# Patient Record
Sex: FEMALE | Race: BLACK OR AFRICAN AMERICAN | Employment: OTHER | URBAN - METROPOLITAN AREA
[De-identification: names, ages, dates, MRNs, and addresses within clinical notes are randomized per-mention and may not be internally consistent; named-entity substitution may affect disease eponyms.]

---

## 2022-03-18 PROBLEM — I44.2 CHB (COMPLETE HEART BLOCK) (HCC): Status: ACTIVE | Noted: 2018-06-15

## 2022-03-18 PROBLEM — Z79.1 LONG TERM (CURRENT) USE OF NON-STEROIDAL ANTI-INFLAMMATORIES (NSAID): Status: ACTIVE | Noted: 2021-08-12

## 2022-03-18 PROBLEM — Z79.891 LONG TERM (CURRENT) USE OF OPIATE ANALGESIC: Status: ACTIVE | Noted: 2021-08-12

## 2022-03-19 PROBLEM — M79.2 PERIPHERAL NEUROPATHIC PAIN: Status: ACTIVE | Noted: 2021-08-12

## 2022-03-19 PROBLEM — M54.50 LOW BACK PAIN: Status: ACTIVE | Noted: 2021-08-12

## 2022-03-19 PROBLEM — M1A.00X0 CHRONIC GOUTY ARTHRITIS: Status: ACTIVE | Noted: 2021-08-12

## 2022-03-19 PROBLEM — E66.811 CLASS 1 OBESITY: Status: ACTIVE | Noted: 2021-08-12

## 2022-03-19 PROBLEM — R06.09 EXERTIONAL DYSPNEA: Status: ACTIVE | Noted: 2020-01-02

## 2022-03-19 PROBLEM — M1A.9XX0 CHRONIC GOUTY ARTHRITIS: Status: ACTIVE | Noted: 2021-08-12

## 2022-03-19 PROBLEM — I34.0 MODERATE MITRAL REGURGITATION: Status: ACTIVE | Noted: 2021-01-26

## 2022-03-19 PROBLEM — R01.1 MURMUR: Status: ACTIVE | Noted: 2020-01-02

## 2022-03-19 PROBLEM — I35.0 MODERATE AORTIC STENOSIS: Status: ACTIVE | Noted: 2021-01-26

## 2022-03-19 PROBLEM — M54.2 NECK PAIN: Status: ACTIVE | Noted: 2021-08-12

## 2022-03-19 PROBLEM — E66.9 CLASS 1 OBESITY: Status: ACTIVE | Noted: 2021-08-12

## 2022-03-20 PROBLEM — I27.20 PULMONARY HYPERTENSION (HCC): Status: ACTIVE | Noted: 2022-02-09

## 2022-03-20 PROBLEM — I35.1 AORTIC VALVE REGURGITATION: Status: ACTIVE | Noted: 2021-01-26

## 2022-06-13 ENCOUNTER — TELEPHONE (OUTPATIENT)
Dept: PULMONOLOGY | Age: 87
End: 2022-06-13

## 2022-06-13 NOTE — TELEPHONE ENCOUNTER
Patient's nebulizer is running hot. Took it to [facility] and they can not do anything.   Asking for another nebulizer

## 2022-06-15 DIAGNOSIS — J44.9 OBSTRUCTIVE CHRONIC BRONCHITIS WITHOUT EXACERBATION (HCC): Primary | ICD-10-CM

## 2022-06-27 ENCOUNTER — TELEPHONE (OUTPATIENT)
Dept: PULMONOLOGY | Age: 87
End: 2022-06-27

## 2022-06-27 ENCOUNTER — OFFICE VISIT (OUTPATIENT)
Dept: PULMONOLOGY | Age: 87
End: 2022-06-27
Payer: MEDICARE

## 2022-06-27 ENCOUNTER — CLINICAL DOCUMENTATION (OUTPATIENT)
Dept: PULMONOLOGY | Age: 87
End: 2022-06-27

## 2022-06-27 VITALS
SYSTOLIC BLOOD PRESSURE: 126 MMHG | OXYGEN SATURATION: 97 % | BODY MASS INDEX: 31.61 KG/M2 | HEIGHT: 60 IN | TEMPERATURE: 97.5 F | DIASTOLIC BLOOD PRESSURE: 84 MMHG | WEIGHT: 161 LBS | HEART RATE: 88 BPM

## 2022-06-27 DIAGNOSIS — Z95.2 S/P TAVR (TRANSCATHETER AORTIC VALVE REPLACEMENT): ICD-10-CM

## 2022-06-27 DIAGNOSIS — I42.0 DILATED CARDIOMYOPATHY (HCC): ICD-10-CM

## 2022-06-27 DIAGNOSIS — I34.0 MODERATE MITRAL REGURGITATION: ICD-10-CM

## 2022-06-27 DIAGNOSIS — J44.9 OBSTRUCTIVE CHRONIC BRONCHITIS WITHOUT EXACERBATION (HCC): ICD-10-CM

## 2022-06-27 DIAGNOSIS — R09.02 HYPOXIA: ICD-10-CM

## 2022-06-27 DIAGNOSIS — R06.09 EXERTIONAL DYSPNEA: Primary | ICD-10-CM

## 2022-06-27 DIAGNOSIS — I27.20 PULMONARY HYPERTENSION (HCC): ICD-10-CM

## 2022-06-27 PROCEDURE — 99214 OFFICE O/P EST MOD 30 MIN: CPT | Performed by: NURSE PRACTITIONER

## 2022-06-27 PROCEDURE — 1090F PRES/ABSN URINE INCON ASSESS: CPT | Performed by: NURSE PRACTITIONER

## 2022-06-27 PROCEDURE — 1123F ACP DISCUSS/DSCN MKR DOCD: CPT | Performed by: NURSE PRACTITIONER

## 2022-06-27 PROCEDURE — G8427 DOCREV CUR MEDS BY ELIG CLIN: HCPCS | Performed by: NURSE PRACTITIONER

## 2022-06-27 PROCEDURE — 3023F SPIROM DOC REV: CPT | Performed by: NURSE PRACTITIONER

## 2022-06-27 PROCEDURE — G8417 CALC BMI ABV UP PARAM F/U: HCPCS | Performed by: NURSE PRACTITIONER

## 2022-06-27 PROCEDURE — 1036F TOBACCO NON-USER: CPT | Performed by: NURSE PRACTITIONER

## 2022-06-27 RX ORDER — LOSARTAN POTASSIUM 50 MG/1
50 TABLET ORAL DAILY
COMMUNITY

## 2022-06-27 RX ORDER — ASPIRIN 81 MG/1
81 TABLET ORAL DAILY
COMMUNITY

## 2022-06-27 ASSESSMENT — ENCOUNTER SYMPTOMS
ABDOMINAL PAIN: 0
SHORTNESS OF BREATH: 1
CONSTIPATION: 0
CHEST TIGHTNESS: 0
VOMITING: 0
NAUSEA: 0
COUGH: 0
DIARRHEA: 0
WHEEZING: 0

## 2022-06-27 NOTE — PROGRESS NOTES
She has a very pleasant 70-year-old female, well-known to me, with history of COPD, hypoxemia, pulmonary hypertension who was seen as a work in secondary to worsening shortness of breath over the past 2 days. History is also notable for complete heart block, aortic stenosis/regurgitation, mitral regurgitation;  history of dilated cardiomyopathy which normalized on follow-up echo, followed  by Dr. Bere Thorne.  RVSP estimated at 48 mmHg. Review of visit 9/2021 with Dr. Bere Thorne indicates that aortic stenosis is borderline severe and nearing indication for AVR, possible TAVR. Patient is now status post TAVR 3/16/2022. Echo 4/12/2022 severely dilated left atrium. LVH, EF 81-06%, grade 1 diastolic dysfunction, bioprosthetic AV. Peak gradient 60 mmHg, mean gradient 31 mmHg. RVSP 28 mmHg. Patient's family reported to us today that she had cardiology appointment this morning. Review of record reveals that this was for pacemaker device check and notes indicate normal function. DIAGNOSTICS:         [facility] 2008. Spirometry 8/06 - moderate obstructive defect. Spirometry 2007, 2009, 2010. CXR 2006, 2007, 2009, 2010 - no acute process. CT of neck 3/10 - normal.   Bronchoscopy 3/10 - normal.   Spirometry 9/17/10 - normal but interval decline in FVC and FEV 1. Spirometry 9/11 -- mild airway obstruction, there is mild interval decline in FEV1. CXR 9/11 -- no acute abnormality. PA and lateral CXR 10/22/12 -- mild hyperinflation, no air space disease, stable. Spirometry 4/22/13 -- mild airway obstruction, interval decline. Spirometry 11/5/14 - mild obstruction, no significant change. CXR 11/5/14 - Stable cardiomegaly, mild hyperinflation. No significant interval change. Spirometry 4/2015 - normal.   CXR 4/8/2015 - no acute abnormality, stable mild cardiomegaly.    Spirometry 8/10/2015 - normal.   ECHO 6/5/2017 demonstrated moderate left atrial dilatation, mild LVH, EF 5055%, mild MR, moderate pulmonary hypertension with RVSP estimated at 49 mmHg. Ambulatory oximetry 9/27/2017baseline saturation room air at rest 92%, saturation 88% on room air with ambulation, saturation 96% on 2 L/min with ambulation. Overnight oximetry 10/2017adequate saturation on 3 L/min. Spirometry 12/20/2017 - mild obstructive defect, interval improvement. Spirometry 12/31/2018 - mild obstructive defect, interval decline, technically limited study. Echo 1/7/2020 EF 85-73%, grade 1 diastolic dysfunction, severely dilated left atrium, mildmoderate AR, moderate MR and AS.  RVSP estimated at 45 mmHg. Spirometry 1/22/2020 mild obstructive defect, slight improvement in FVC and FEV1. Spirometry 1/26/2021mild obstructive defect, interval decrease in FVC and FEV1. Echo 2/10/2021EF 60-65%, grade II diastolic dysfunction, severe aortic stenosis, mild AR, mildmoderate MR, mildmoderate TR, mild pulmonary hypertension with  estimated RVSP of 48 mmHg. Echo 4/12/2022 severely dilated left atrium. LVH, EF 50-84%, grade 1 diastolic dysfunction, bioprosthetic AV. Peak gradient 60 mmHg, mean gradient 31 mmHg.   RVSP 28 mmHg.

## 2022-06-27 NOTE — TELEPHONE ENCOUNTER
TRIAGE CALL      Complaint: Increased SOB  Cough: no  Productive:  no  Bloody Sputum:  no  Increased SOB/Wheezing:  yes  Duration: 2 days  Fever/Chills: no  OTC Meds tried: none  Asking if she can be seen today

## 2022-06-27 NOTE — PROGRESS NOTES
[address] S[place] Trujillo. [address], [address] [facility]  [phone]    Patient Name:  Tracy Matthew    YOB: 1931    Office Visit 6/27/2022      CHIEF COMPLAINT:      Chief Complaint   Patient presents with    Shortness of Breath    Other     work-in         HISTORY OF PRESENT ILLNESS:     She has a very pleasant 70-year-old female, well-known to me, with history of COPD, hypoxemia, pulmonary hypertension who was seen as a work in secondary to worsening shortness of breath over the past 2 days.     To recap, history is also notable for complete heart block, aortic stenosis/regurgitation, mitral regurgitation;  history of dilated cardiomyopathy which normalized on follow-up echo, followed  by Dr. Abiel Jones.  RVSP estimated at 48 mmHg. Review of visit 9/2021 with Dr. Abiel Jones indicates that aortic stenosis is borderline severe and nearing indication for AVR, possible TAVR.        Patient is now status post TAVR 3/16/2022. Echo 4/12/2022- severely dilated left atrium. LVH, EF 94-08%, grade 1 diastolic dysfunction, bioprosthetic AV. Peak gradient 60 mmHg, mean gradient 31 mmHg. RVSP 28 mmHg. Patient's family reported to us today that she had cardiology appointment this morning. Review of record reveals that this was for pacemaker device check and notes indicate normal function. Today, she reports that dyspnea is improved compared to prior to TAVR but has increased somewhat over the past 3 weeks. Her family has reported increased over the past 2 days. She reports very minimal wheezing. She denies cough, chest pain, hemoptysis. She is using DuoNeb via nebulizer 3 times daily and budesonide twice daily. Using albuterol inhaler intermittently during the day for relief of shortness of breath. States that she has been using it on average of 3 times daily. She normally uses oxygen with exertion and sleep but does not have it with her today.   She reports compliance with Lasix, low-dose. She is remarkably active for 80year-old. She continues to drive, performs all of her household chores including washing windows and cleaning the blinds. States that she has a zero turn lawnmower and mows 3 acres after her children after her children get the mower started for her.     DIAGNOSTICS:        [facility] 2008.   Spirometry 8/06 - moderate obstructive defect.   Spirometry 2007, 2009, 2010.   CXR 2006, 2007, 2009, 2010 - no acute process.   CT of neck 3/10 - normal.   Bronchoscopy 3/10 - normal.   Spirometry 9/17/10 - normal but interval decline in FVC and FEV 1.    Spirometry 9/11 -- mild airway obstruction, there is mild interval decline in FEV1.    CXR 9/11 -- no acute abnormality.    PA and lateral CXR 10/22/12 -- mild hyperinflation, no air space disease, stable.    Spirometry 4/22/13 -- mild airway obstruction, interval decline.   Spirometry 11/5/14 - mild obstruction, no significant change.  CXR 11/5/14 - Stable cardiomegaly, mild hyperinflation.  No significant interval change.   Spirometry 4/2015 - normal.   CXR 4/8/2015 - no acute abnormality, stable mild cardiomegaly.   Spirometry 8/10/2015 - normal.   ECHO 6/5/2017 demonstrated moderate left atrial dilatation, mild LVH, EF 50-55%, mild MR, moderate pulmonary hypertension with RVSP estimated at 49 mmHg.   Ambulatory oximetry 9/27/2017-baseline saturation room air at rest 92%, saturation 88% on room air with ambulation, saturation 96% on 2 L/min with ambulation.   Overnight oximetry 10/2017-adequate saturation on 3 L/min.   Spirometry 12/20/2017 - mild obstructive defect, interval improvement.   Spirometry 12/31/2018 - mild obstructive defect, interval decline, technically limited study.   Echo 1/7/2020- EF 01-78%, grade 1 diastolic dysfunction, severely dilated left atrium, mild-moderate AR, moderate MR and AS.  RVSP estimated at 45 mmHg.   Spirometry 1/22/2020- mild obstructive defect, slight improvement in FVC and FEV1.   Spirometry 1/26/2021-mild obstructive defect, interval decrease in FVC and FEV1.   Echo 2/10/2021-EF 60-65%, grade II diastolic dysfunction, severe aortic stenosis, mild AR, mild-moderate MR, mild-moderate TR, mild pulmonary hypertension with  estimated RVSP of 48 mmHg. CTA of C/A/P 2/2022- platelike nodular density near the minor fissure, questionable significance. Multichamber cardiomegaly. Pulmonary arterial tree shows no central filling defects. Echo 4/12/2022- severely dilated left atrium. LVH, EF 26-28%, grade 1 diastolic dysfunction, bioprosthetic AV. Peak gradient 60 mmHg, mean gradient 31 mmHg. RVSP 28 mmHg. Ambulatory oximetry 6/27/2022-baseline saturation 97% room air at rest, 90% room air with ambulation.   Baseline heart rate 88, maximum 103 with ambulation.         Past Medical History:   Diagnosis Date    Anemia     CAD (coronary artery disease)     pacemaker    Cervical dystonia     Chronic kidney disease, stage III (moderate) ([facility])     Diaphragmatic hernia without mention of obstruction or gangrene     Dysphagia, late effect of cerebrovascular disease     Dysphasia, late effect of cerebrovascular disease     GERD (gastroesophageal reflux disease)     Gout     Hyperlipidemia     Pacemaker     Sick sinus syndrome ([facility] [address] 9/10/2015    Spastic dysphonia        Patient Active Problem List   Diagnosis    Dysphasia, late effect of cerebrovascular disease    Anemia    Long term (current) use of opiate analgesic    Long term (current) use of non-steroidal anti-inflammatories (nsaid)    Hyperlipidemia    CHB (complete heart block) (HCC)    Chronic gouty arthritis    Class 1 obesity    Dilated cardiomyopathy (HCC)    Exertional dyspnea    Vitamin D deficiency    Hypoxemia    Sick sinus syndrome (HCC)    CAD (coronary artery disease)    Arthritis    Moderate mitral regurgitation    Murmur    Colon neoplasm    Chronic kidney disease    Hypoxia    Peripheral neuropathic pain    Low back pain    Spastic dysphonia    Gout    Neck pain    Essential hypertension with goal blood pressure less than 140/90    Cervical dystonia    Moderate aortic stenosis    GERD (gastroesophageal reflux disease)    Iron deficiency anemia    Pacemaker    Chronic kidney disease, stage III (moderate) (HCC)    Obstructive chronic bronchitis without exacerbation (HCC)    Aortic valve regurgitation    Pulmonary hypertension ([address]         Past Surgical History:   Procedure Laterality Date    COLONOSCOPY      dr Guillermo Randall (CERVIX STATUS UNKNOWN)      ORTHOPEDIC SURGERY      Left knee replacement    ORTHOPEDIC SURGERY      Right foot surgery    ORTHOPEDIC SURGERY      Right knee replacement    ORTHOPEDIC SURGERY      Left Total Knee    PACEMAKER  ,     [place] CARDIAC SURG PROCEDURE UNLIST           Social History     Socioeconomic History    Marital status:      Spouse name: None    Number of children: None    Years of education: None    Highest education level: None   Occupational History    None   Tobacco Use    Smoking status: Former Smoker     Packs/day: 0.00     Quit date: 1960     Years since quittin.5    Smokeless tobacco: Never Used    Tobacco comment: Quit smoking: she attempt to smoke x 1 yr, did not smoke 1 total of 1 pk   Substance and Sexual Activity    Alcohol use: No     Alcohol/week: 0.0 standard drinks    Drug use: No    Sexual activity: None   Other Topics Concern    None   Social History Narrative    She denies ETOH use. She has a 15 pack year history of tobacco smoking but quit in . There is no history of toxic industrial or TB exposure. She is  and resides alone.      Social Determinants of Health     Financial Resource Strain:     Difficulty of Paying Living Expenses: Not on file   Food Insecurity:     Worried About Running Out of Food in the Last Year: Not on file    Kimberly [employer]i Food in the Last Year: Not on file   Transportation Needs:     Lack of Transportation (Medical): Not on file    Lack of Transportation (Non-Medical):  Not on file   Physical Activity:     Days of Exercise per Week: Not on file    Minutes of Exercise per Session: Not on file   Stress:     Feeling of Stress : Not on file   Social Connections:     Frequency of Communication with Friends and Family: Not on file    Frequency of Social Gatherings with Friends and Family: Not on file    Attends Jehovah's witness Services: Not on file    Active Member of [address] [place] [address] [employer] or Organizations: Not on file    Attends Club or Organization Meetings: Not on file    Marital Status: Not on file   Intimate Partner Violence:     Fear of Current or Ex-Partner: Not on file    Emotionally Abused: Not on file    Physically Abused: Not on file    Sexually Abused: Not on file   Housing Stability:     Unable to Pay for Housing in the Last Year: Not on file    Number of [place] in the Last Year: Not on file    Unstable Housing in the Last Year: Not on file       Family History   Problem Relation Age of Onset    Cancer Sister     Heart Disease Mother     Cancer Father        Allergies   Allergen Reactions    Amlodipine Angioedema     Swelling of lips    Lisinopril Anaphylaxis, Shortness Of Breath and Swelling     Lips and tongue swell    Omeprazole Anaphylaxis     Legs feel numb, feels like her lips are thicker     Oxycodone Hives     Mental incapacity       Current Outpatient Medications   Medication Sig    aspirin EC 81 MG EC tablet Take 81 mg by mouth daily    apixaban (ELIQUIS) 5 MG TABS tablet Take 5 mg by mouth 2 times daily    losartan (COZAAR) 50 MG tablet Take 50 mg by mouth daily    OXYGEN Inhale into the lungs 2 lpm HS    albuterol sulfate  (90 Base) MCG/ACT inhaler 2 puffs 4 times daily prn    budesonide (PULMICORT) 0.5 MG/2ML nebulizer suspension Inhale 500 mcg into the lungs 2 times daily    carvedilol (COREG) 25 MG tablet Take 25 mg by mouth 2 times daily (with meals)    ergocalciferol (ERGOCALCIFEROL) 1.25 MG (38929 UT) capsule Take 50,000 Units by mouth every 7 days    ferrous sulfate (IRON 325) 325 (65 Fe) MG tablet Take 325 mg by mouth 2 times daily (with meals)    folic acid (FOLVITE) 1 MG tablet Take 1 mg by mouth daily    furosemide (LASIX) 20 MG tablet Take 20 mg by mouth daily    hydrALAZINE (APRESOLINE) 100 MG tablet Take 100 mg by mouth 3 times daily    ipratropium-albuterol (DUONEB) 0.5-2.5 (3) MG/3ML SOLN nebulizer solution USE ONE VIAL VIA NEBULIZER 4 TIMES DAILY. Maury vasquez [employer] part B, dx - COPD, J44.9    latanoprost (XALATAN) 0.005 % ophthalmic solution Apply 1 drop to eye    aspirin 325 MG tablet Take 325 mg by mouth every 4 hours as needed (Patient not taking: Reported on 6/27/2022)    cephALEXin (KEFLEX) 500 MG capsule Take 500 mg by mouth 3 times daily (Patient not taking: Reported on 6/27/2022)    cyanocobalamin 1000 MCG/ML injection Inject 1,000 mcg into the muscle every 30 days (Patient not taking: Reported on 6/27/2022)    cyclobenzaprine (FLEXERIL) 5 MG tablet TAKE 1 TABLET BY MOUTH TWICE DAILY FOR MUSCLE RELAXATION (Patient not taking: Reported on 6/27/2022)    primidone (MYSOLINE) 50 MG tablet Take 50 mg by mouth daily (Patient not taking: Reported on 6/27/2022)     No current facility-administered medications for this visit. REVIEW OF SYSTEMS:    Review of Systems   Constitutional: Negative for chills, fatigue, fever and unexpected weight change. Respiratory: Positive for shortness of breath. Negative for cough, chest tightness and wheezing. Very rare episodes of wheezing   Cardiovascular: Negative for chest pain, palpitations and leg swelling. Gastrointestinal: Negative for abdominal pain, constipation, diarrhea, nausea and vomiting. Neurological: Negative for dizziness, tremors, seizures, weakness and headaches.             PHYSICAL EXAM:    Vitals: 06/27/22 1459   BP: 126/84   Pulse: 88   Temp: 97.5 °F (36.4 °C)   TempSrc: Skin   SpO2: 97%   Weight: 161 lb (73 kg)   Height: 5' (1.524 m)        Body mass index is 31.44 kg/m². GENERAL APPEARANCE:   The patient is overweight and in no respiratory distress. Appears younger than her age. HEENT:  PERRL. Conjunctivae unremarkable. NECK/LYMPHATIC:   Symmetrical with no elevation of jugular venous pulsation. Trachea midline. No thyroid enlargement. No cervical adenopathy. LUNGS:   Normal respiratory effort with symmetrical lung expansion. Breath sounds - decreased but overall are clear. HEART:   There is a normal rate and regular rhythm. No rub, or gallop. II/VI systolic murmur. There is no edema in the lower extremities. NEURO:  The patient is alert and oriented to person, place, and time. Memory appears intact and mood is normal.  No gross sensorimotor deficits are present. DIAGNOSTIC TESTS: Studies were personally reviewed by me and discussed with the patient. CTA of C/A/P 2/2022- platelike nodular density near the minor fissure, questionable significance. Multichamber cardiomegaly. Pulmonary arterial tree shows no central filling defects. Echo 4/12/2022- severely dilated left atrium. LVH, EF 56-70%, grade 1 diastolic dysfunction, bioprosthetic AV. Peak gradient 60 mmHg, mean gradient 31 mmHg. RVSP 28 mmHg. Ambulatory oximetry 6/27/2022-baseline saturation 97% room air at rest, 90% room air with ambulation. Baseline heart rate 88, maximum 103 with ambulation. ASSESSMENT:   (Medical Decision Making)                                                                                                                                          Encounter Diagnoses   Name Primary?     Exertional dyspnea Yes    Obstructive chronic bronchitis without exacerbation (HCC)     Pulmonary hypertension (HCC)     Hypoxia     Moderate mitral regurgitation     S/P TAVR (transcatheter aortic valve replacement)     Dilated cardiomyopathy ([facility] [address]      Patient has noted some increase in shortness of breath over the past few weeks, family called to report, has noted increasing dyspnea over the past 3 days. Ambulatory oximetry today demonstrates adequate saturation on room air. Exam is unremarkable. She has been using nebulizer with DuoNeb 3 times daily, (advised that she needs to increase to 4 times daily), add budesonide twice daily. She has been compliant with low-dose diuretic. Status post TAVR. She has moderate MR. No evidence of definite volume overload on exam.  Will check BNP and chest x-ray. She has remained very active for her age and likely overdoing it. I have asked her to curtail her activity slightly due to increased symptoms. PLAN:     BNP on way out, I will call her with results. Increase duoneb to 4 times daily on regular basis. Add budesonide twice daily. May use albuterol inhaler 2 puffs 2 additional times daily if needed for increased shortness of breath. Advised to avoid using albuterol in any form more than 6 times in 24 hours. Oxygen with sleep and with exertion as prescribed, although oxygen level today with exertion is normal without oxygen. I have advised her to seek urgent medical attention for increased symptoms, ER for distress. Addendum:    Lab closed before patient could get there for BNP. She will have tomorrow and I have also added order for CXR, had to leave message on her VM about CXR. I will call her with results on Wednesday as I will be out of the office tomorrow. Follow-up and Dispositions    · Return for appt as scheduled, COPD, Luke, hypoxia.            Orders Placed This Encounter   Procedures    XR CHEST PA LAT (2 VIEWS)     Standing Status:   Future     Standing Expiration Date:   6/27/2023    proBNP, N-TERMINAL     Standing Status:   Future     Standing Expiration Date:   6/27/2023 Sindhu Black, APRN - CNP    Total  time spent with patient - 44 min. Over 50% of today's office visit was spent in face to face time reviewing test results, prognosis, importance of compliance, education about disease process, benefits of medications, instructions for management of acute symptoms, and follow up plans. Collaborating MD: Dr. Sarah Ceja    Electronically signed. Dictated using voice recognition software.   Proof read but unrecognized errors may exist.

## 2022-06-27 NOTE — TELEPHONE ENCOUNTER
I ordered BNP on Ms Swati Lucas, she went to the lab before 4:15, but apparently they close at 4pm.     She will come to have BNP tomorrow, but I have also added CXR. I had to leave message on her VM. Can you please call her in the morning to ensure she knows to get CXR. I am off tomorrow and will call her with results of BNP and CXR on Wednesday. Thank you.

## 2022-06-27 NOTE — TELEPHONE ENCOUNTER
Last seen: 2/9/22  Hx: COPD, hypoxemia, pulm HTN, CAD, GERD, CKD    O2 w/ sleep & exertion. Family reporting 2 days of increased sob and requesting office appt; no fevers, had cardiology appt this AM, family will double check with patient regarding ability to make appt this afternoon, was hoping to be seen some time this week.

## 2022-06-27 NOTE — PATIENT INSTRUCTIONS
BNP on way out, I will call her with results. Increase duoneb to 4 times daily on regular basis. Add budesonide twice daily. May use albuterol inhaler 2 puffs 2 additional times daily if needed for increased shortness of breath. Advised to avoid using albuterol in any form more than 6 times in 24 hours. Oxygen with sleep and with exertion as prescribed, although oxygen level today with exertion is normal without oxygen.

## 2022-06-28 ENCOUNTER — HOSPITAL ENCOUNTER (OUTPATIENT)
Dept: GENERAL RADIOLOGY | Age: 87
Discharge: HOME | End: 2022-07-01
Payer: MEDICARE

## 2022-06-28 DIAGNOSIS — I42.0 DILATED CARDIOMYOPATHY (HCC): ICD-10-CM

## 2022-06-28 DIAGNOSIS — R06.09 EXERTIONAL DYSPNEA: ICD-10-CM

## 2022-06-28 DIAGNOSIS — Z95.2 S/P TAVR (TRANSCATHETER AORTIC VALVE REPLACEMENT): ICD-10-CM

## 2022-06-28 DIAGNOSIS — I34.0 MODERATE MITRAL REGURGITATION: ICD-10-CM

## 2022-06-28 DIAGNOSIS — R09.02 HYPOXIA: ICD-10-CM

## 2022-06-28 LAB — NT PRO BNP: 589 PG/ML

## 2022-06-28 PROCEDURE — 71046 X-RAY EXAM CHEST 2 VIEWS: CPT

## 2022-06-29 ENCOUNTER — TELEPHONE (OUTPATIENT)
Dept: PULMONOLOGY | Age: 87
End: 2022-06-29

## 2022-06-29 NOTE — TELEPHONE ENCOUNTER
BNP is 589. CXR shows The lungs are clear. Stable cardiomegaly. Implantable cardiacdevice left chest and both leads are unchanged in position. Patient is statuspost transcatheter aortic valve replacement. No pneumothorax. No pleural effusions. Degenerative lower thoracic spine changes are noted. Spoke with family, she is unavailable by phone and confirmed family member on CAMILLA form. Advise increasing lasix 20mg from 1 tablet to 2 tablets daily, for 3 days only, then resume 1 tablet daily. Advise to request patient to note any improvement in shortness of breath. May need to increase prn but would ultimately defer to cardiology. Keep appt with me in August and cardiology in July.

## 2022-08-08 NOTE — PROGRESS NOTES
She has a very pleasant 55-year-old female, well-known to me, with history of COPD, hypoxemia, pulmonary hypertension seen today for follow up. To recap, history is also notable for complete heart block, aortic stenosis/regurgitation, mitral regurgitation;  history of dilated cardiomyopathy which normalized on follow-up echo, followed  by Dr. Luda Dela Cruz. RVSP estimated at 48 mmHg. Review of visit 9/2021 with Dr. Luda Dela Cruz indicates that aortic stenosis is borderline severe and nearing indication for AVR, possible TAVR. Patient is now status post TAVR 3/16/2022. Echo 4/12/2022- severely dilated left atrium. LVH, EF 02-65%, grade 1 diastolic dysfunction, bioprosthetic AV. Peak gradient 60 mmHg, mean gradient 31 mmHg. RVSP 28 mmHg. Patient's family reported to us today that she had cardiology appointment this morning. Review of record reveals that this was for pacemaker device check and notes indicate normal function. DIAGNOSTICS:          ECHO 2008. Spirometry 8/06 - moderate obstructive defect. Spirometry 2007, 2009, 2010. CXR 2006, 2007, 2009, 2010 - no acute process. CT of neck 3/10 - normal.   Bronchoscopy 3/10 - normal.   Spirometry 9/17/10 - normal but interval decline in FVC and FEV 1. Spirometry 9/11 -- mild airway obstruction, there is mild interval decline in FEV1. CXR 9/11 -- no acute abnormality. PA and lateral CXR 10/22/12 -- mild hyperinflation, no air space disease, stable. Spirometry 4/22/13 -- mild airway obstruction, interval decline. Spirometry 11/5/14 - mild obstruction, no significant change. CXR 11/5/14 - Stable cardiomegaly, mild hyperinflation. No significant interval change. Spirometry 4/2015 - normal.   CXR 4/8/2015 - no acute abnormality, stable mild cardiomegaly.    Spirometry 8/10/2015 - normal.   ECHO 6/5/2017 demonstrated moderate left atrial dilatation, mild LVH, EF 50-55%, mild MR, moderate pulmonary hypertension with RVSP estimated at 49 mmHg. Ambulatory oximetry 9/27/2017-baseline saturation room air at rest 92%, saturation 88% on room air with ambulation, saturation 96% on 2 L/min with ambulation. Overnight oximetry 10/2017-adequate saturation on 3 L/min. Spirometry 12/20/2017 - mild obstructive defect, interval improvement. Spirometry 12/31/2018 - mild obstructive defect, interval decline, technically limited study. Echo 1/7/2020- EF 17-72%, grade 1 diastolic dysfunction, severely dilated left atrium, mild-moderate AR, moderate MR and AS. RVSP estimated at 45 mmHg. Spirometry 1/22/2020- mild obstructive defect, slight improvement in FVC and FEV1. Spirometry 1/26/2021-mild obstructive defect, interval decrease in FVC and FEV1. Echo 2/10/2021-EF 60-65%, grade II diastolic dysfunction, severe aortic stenosis, mild AR, mild-moderate MR, mild-moderate TR, mild pulmonary hypertension with  estimated RVSP of 48 mmHg. CTA of C/A/P 2/2022- platelike nodular density near the minor fissure, questionable significance. Multichamber cardiomegaly. Pulmonary arterial tree shows no central filling defects. Echo 4/12/2022- severely dilated left atrium. LVH, EF 51-02%, grade 1 diastolic dysfunction, bioprosthetic AV. Peak gradient 60 mmHg, mean gradient 31 mmHg. RVSP 28 mmHg. Ambulatory oximetry 6/27/2022-baseline saturation 97% room air at rest, 90% room air with ambulation. Baseline heart rate 88, maximum 103 with ambulation.

## 2022-08-09 ENCOUNTER — OFFICE VISIT (OUTPATIENT)
Dept: PULMONOLOGY | Age: 87
End: 2022-08-09
Payer: MEDICARE

## 2022-08-09 VITALS
OXYGEN SATURATION: 95 % | SYSTOLIC BLOOD PRESSURE: 138 MMHG | BODY MASS INDEX: 32.2 KG/M2 | TEMPERATURE: 96.8 F | HEART RATE: 75 BPM | HEIGHT: 60 IN | DIASTOLIC BLOOD PRESSURE: 90 MMHG | WEIGHT: 164 LBS

## 2022-08-09 DIAGNOSIS — I27.20 PULMONARY HYPERTENSION (HCC): ICD-10-CM

## 2022-08-09 DIAGNOSIS — J44.9 OBSTRUCTIVE CHRONIC BRONCHITIS WITHOUT EXACERBATION (HCC): Primary | ICD-10-CM

## 2022-08-09 DIAGNOSIS — Z95.2 S/P TAVR (TRANSCATHETER AORTIC VALVE REPLACEMENT): ICD-10-CM

## 2022-08-09 DIAGNOSIS — R09.02 HYPOXIA: ICD-10-CM

## 2022-08-09 LAB
EXPIRATORY TIME: NORMAL
FEF 25-75% %PRED-PRE: NORMAL
FEF 25-75% PRED: NORMAL
FEF 25-75%-PRE: NORMAL
FEV1 %PRED-PRE: 96 %
FEV1 PRED: NORMAL
FEV1/FVC %PRED-PRE: NORMAL
FEV1/FVC PRED: NORMAL
FEV1/FVC: 58 %
FEV1: 0.86 L
FVC %PRED-PRE: 128 %
FVC PRED: NORMAL
FVC: 1.47 L
PEF %PRED-PRE: NORMAL
PEF PRED: NORMAL
PEF-PRE: NORMAL

## 2022-08-09 PROCEDURE — 3023F SPIROM DOC REV: CPT | Performed by: NURSE PRACTITIONER

## 2022-08-09 PROCEDURE — 99213 OFFICE O/P EST LOW 20 MIN: CPT | Performed by: NURSE PRACTITIONER

## 2022-08-09 PROCEDURE — 1090F PRES/ABSN URINE INCON ASSESS: CPT | Performed by: NURSE PRACTITIONER

## 2022-08-09 PROCEDURE — 94010 BREATHING CAPACITY TEST: CPT | Performed by: NURSE PRACTITIONER

## 2022-08-09 PROCEDURE — 1123F ACP DISCUSS/DSCN MKR DOCD: CPT | Performed by: NURSE PRACTITIONER

## 2022-08-09 PROCEDURE — G8417 CALC BMI ABV UP PARAM F/U: HCPCS | Performed by: NURSE PRACTITIONER

## 2022-08-09 PROCEDURE — G8427 DOCREV CUR MEDS BY ELIG CLIN: HCPCS | Performed by: NURSE PRACTITIONER

## 2022-08-09 PROCEDURE — 1036F TOBACCO NON-USER: CPT | Performed by: NURSE PRACTITIONER

## 2022-08-09 RX ORDER — ALBUTEROL SULFATE 90 UG/1
AEROSOL, METERED RESPIRATORY (INHALATION)
Qty: 1 EACH | Refills: 11 | Status: SHIPPED | OUTPATIENT
Start: 2022-08-09

## 2022-08-09 RX ORDER — FAMOTIDINE 20 MG/1
20 TABLET, FILM COATED ORAL 2 TIMES DAILY
COMMUNITY

## 2022-08-09 ASSESSMENT — PULMONARY FUNCTION TESTS
FEV1: 0.86
FEV1_PERCENT_PREDICTED_PRE: 96
FVC_PERCENT_PREDICTED_PRE: 128
FEV1/FVC: 58
FVC: 1.47

## 2022-08-09 ASSESSMENT — ENCOUNTER SYMPTOMS
WHEEZING: 0
DIARRHEA: 0
SHORTNESS OF BREATH: 0
CHEST TIGHTNESS: 0
ABDOMINAL PAIN: 0
VOMITING: 0
COUGH: 0
CONSTIPATION: 0
NAUSEA: 0

## 2022-08-09 NOTE — PATIENT INSTRUCTIONS
Use duoneb 3 to 4 times daily on regular basis. Add budesonide twice daily. May use albuterol inhaler when away from home/nebulizer. Oxygen with sleep and with exertion as prescribed, although oxygen level today with exertion is normal without oxygen. 2nd COVID booster is advised. Flu vaccine in the fall. She is up to date on pneumonia vaccines.

## 2022-08-09 NOTE — PROGRESS NOTES
[address] [facility]. [address], [address] [facility]  [phone]    Patient Name:  Anum Frazier    YOB: 1931    Office Visit 8/9/2022      CHIEF COMPLAINT:      Chief Complaint   Patient presents with    COPD    Follow-up    Other     O2         HISTORY OF PRESENT ILLNESS:     She has a very pleasant 70-year-old female, well-known to me, with history of COPD, hypoxemia, pulmonary hypertension seen today for follow up. To recap, history is also notable for complete heart block, aortic stenosis/regurgitation, mitral regurgitation;  history of dilated cardiomyopathy which normalized on follow-up echo, followed  by Dr. Ye Oconnell. RVSP estimated at 48 mmHg. Review of visit 9/2021 with Dr. Ye Oconnlel indicates that aortic stenosis is borderline severe and nearing indication for AVR, possible TAVR. Patient is now status post TAVR 3/16/2022. Echo 4/12/2022- severely dilated left atrium. LVH, EF 93-38%, grade 1 diastolic dysfunction, bioprosthetic AV. Peak gradient 60 mmHg, mean gradient 31 mmHg. RVSP 28 mmHg. ECHO 7/12/2022-EF 40-70%, grade 2 diastolic dysfunction, LVH, mildly elevated gradients aortic valve. Mild MR, mild TR, RVSP estimated at 38 mm. Today, she reports that exertional dyspnea is at baseline. No significant wheezing or cough. She is compliant with DuoNeb via nebulizer 2-3 times daily and budesonide 3 times daily. She is also compliant with supplemental oxygen as prescribed. DIAGNOSTICS:          ECHO 2008. Spirometry 8/06 - moderate obstructive defect. Spirometry 2007, 2009, 2010. CXR 2006, 2007, 2009, 2010 - no acute process. CT of neck 3/10 - normal.   Bronchoscopy 3/10 - normal.   Spirometry 9/17/10 - normal but interval decline in FVC and FEV 1. Spirometry 9/11 -- mild airway obstruction, there is mild interval decline in FEV1. CXR 9/11 -- no acute abnormality.    PA and lateral CXR 10/22/12 -- mild hyperinflation, no air space disease, stable. Spirometry 4/22/13 -- mild airway obstruction, interval decline. Spirometry 11/5/14 - mild obstruction, no significant change. CXR 11/5/14 - Stable cardiomegaly, mild hyperinflation. No significant interval change. Spirometry 4/2015 - normal.   CXR 4/8/2015 - no acute abnormality, stable mild cardiomegaly. Spirometry 8/10/2015 - normal.   ECHO 6/5/2017 demonstrated moderate left atrial dilatation, mild LVH, EF 50-55%, mild MR, moderate pulmonary hypertension with RVSP estimated at 49 mmHg. Ambulatory oximetry 9/27/2017-baseline saturation room air at rest 92%, saturation 88% on room air with ambulation, saturation 96% on 2 L/min with ambulation. Overnight oximetry 10/2017-adequate saturation on 3 L/min. Spirometry 12/20/2017 - mild obstructive defect, interval improvement. Spirometry 12/31/2018 - mild obstructive defect, interval decline, technically limited study. Echo 1/7/2020- EF 05-86%, grade 1 diastolic dysfunction, severely dilated left atrium, mild-moderate AR, moderate MR and AS. RVSP estimated at 45 mmHg. Spirometry 1/22/2020- mild obstructive defect, slight improvement in FVC and FEV1. Spirometry 1/26/2021-mild obstructive defect, interval decrease in FVC and FEV1. Echo 2/10/2021-EF 60-65%, grade II diastolic dysfunction, severe aortic stenosis, mild AR, mild-moderate MR, mild-moderate TR, mild pulmonary hypertension with  estimated RVSP of 48 mmHg. CTA of C/A/P 2/2022- platelike nodular density near the minor fissure, questionable significance. Multichamber cardiomegaly. Pulmonary arterial tree shows no central filling defects. Echo 4/12/2022- severely dilated left atrium. LVH, EF 78-32%, grade 1 diastolic dysfunction, bioprosthetic AV. Peak gradient 60 mmHg, mean gradient 31 mmHg. RVSP 28 mmHg. Ambulatory oximetry 6/27/2022-baseline saturation 97% room air at rest, 90% room air with ambulation.   Baseline heart rate 88, maximum 103 with ambulation. ECHO 7/12/2022-EF 80-87%, grade 2 diastolic dysfunction, LVH, mildly elevated gradients aortic valve. Mild MR, mild TR, RVSP estimated at 38 mm. Spirometry 8/9/2022-mild obstructive defect, no significant change.     Past Medical History:   Diagnosis Date    Anemia     CAD (coronary artery disease)     pacemaker    Cervical dystonia     Chronic kidney disease, stage III (moderate) (HCC)     Diaphragmatic hernia without mention of obstruction or gangrene     Dysphagia, late effect of cerebrovascular disease     Dysphasia, late effect of cerebrovascular disease     GERD (gastroesophageal reflux disease)     Gout     Hyperlipidemia     Pacemaker     Sick sinus syndrome ([address] 9/10/2015    Spastic dysphonia        Patient Active Problem List   Diagnosis    Dysphasia, late effect of cerebrovascular disease    Anemia    Long term (current) use of opiate analgesic    Long term (current) use of non-steroidal anti-inflammatories (nsaid)    Hyperlipidemia    CHB (complete heart block) (HCC)    Chronic gouty arthritis    Class 1 obesity    Dilated cardiomyopathy (HCC)    Exertional dyspnea    Vitamin D deficiency    Hypoxemia    Sick sinus syndrome (HCC)    CAD (coronary artery disease)    Arthritis    Moderate mitral regurgitation    Murmur    Colon neoplasm    Chronic kidney disease    Hypoxia    Peripheral neuropathic pain    Low back pain    Spastic dysphonia    Gout    Neck pain    Essential hypertension with goal blood pressure less than 140/90    Cervical dystonia    Moderate aortic stenosis    GERD (gastroesophageal reflux disease)    Iron deficiency anemia    Pacemaker    Chronic kidney disease, stage III (moderate) (HCC)    Obstructive chronic bronchitis without exacerbation (HCC)    Aortic valve regurgitation    Pulmonary hypertension ([address]         Past Surgical History:   Procedure Laterality Date    COLONOSCOPY  2015    dr Iam Enamorado (CERVIX STATUS UNKNOWN) 108 (90 Base) MCG/ACT inhaler 2 puffs 4 times daily prn    budesonide (PULMICORT) 0.5 MG/2ML nebulizer suspension Inhale 500 mcg into the lungs 2 times daily    carvedilol (COREG) 25 MG tablet Take 25 mg by mouth 2 times daily (with meals)    ergocalciferol (ERGOCALCIFEROL) 1.25 MG (27905 UT) capsule Take 50,000 Units by mouth every 7 days    ferrous sulfate (IRON 325) 325 (65 Fe) MG tablet Take 325 mg by mouth 2 times daily (with meals)    folic acid (FOLVITE) 1 MG tablet Take 1 mg by mouth daily    hydrALAZINE (APRESOLINE) 100 MG tablet Take 100 mg by mouth 3 times daily    ipratropium-albuterol (DUONEB) 0.5-2.5 (3) MG/3ML SOLN nebulizer solution USE ONE VIAL VIA NEBULIZER 4 TIMES DAILY. Bill to medicare part B, dx - COPD, J44.9    latanoprost (XALATAN) 0.005 % ophthalmic solution Apply 1 drop to eye    aspirin 325 MG tablet Take 325 mg by mouth every 4 hours as needed (Patient not taking: Reported on 8/9/2022)    cephALEXin (KEFLEX) 500 MG capsule Take 500 mg by mouth 3 times daily (Patient not taking: No sig reported)    cyanocobalamin 1000 MCG/ML injection Inject 1,000 mcg into the muscle every 30 days (Patient not taking: No sig reported)    cyclobenzaprine (FLEXERIL) 5 MG tablet TAKE 1 TABLET BY MOUTH TWICE DAILY FOR MUSCLE RELAXATION (Patient not taking: No sig reported)    furosemide (LASIX) 20 MG tablet Take 20 mg by mouth daily (Patient not taking: Reported on 8/9/2022)    primidone (MYSOLINE) 50 MG tablet Take 50 mg by mouth daily (Patient not taking: No sig reported)     No current facility-administered medications for this visit. REVIEW OF SYSTEMS:    Review of Systems   Constitutional:  Negative for chills, fatigue, fever and unexpected weight change. Respiratory:  Negative for cough, chest tightness, shortness of breath and wheezing. Cardiovascular:  Negative for chest pain, palpitations and leg swelling.    Gastrointestinal:  Negative for abdominal pain, constipation, diarrhea, nausea and vomiting. Neurological:  Negative for dizziness, tremors, seizures, weakness and headaches. PHYSICAL EXAM:    Vitals:    08/09/22 0831   BP: (!) 138/90   Site: Left Upper Arm   Position: Sitting   Cuff Size: Large Adult   Pulse: 75   Temp: 96.8 °F (36 °C)   TempSrc: Skin   SpO2: 95%   Weight: 164 lb (74.4 kg)   Height: 5' (1.524 m)        Body mass index is 32.03 kg/m². GENERAL APPEARANCE:  The patient is normal weight and in no respiratory distress. HEENT:  PERRL. Conjunctivae unremarkable. NECK/LYMPHATIC:   Symmetrical with no elevation of jugular venous pulsation. Trachea midline. LUNGS:   Normal respiratory effort with symmetrical lung expansion. Breath sounds-decreased but clear. Lawernce Roughen HEART:   There is a normal rate and regular rhythm. No  rub, or gallop. Faint systolic murmur. There is no edema in the lower extremities. ABDOMEN:  Soft and non-tender. No hepatosplenomegaly. Bowel sounds are normal.      NEURO:  The patient is alert and oriented to person, place, and time. Memory appears intact and mood is normal.  No gross sensorimotor deficits are present. DIAGNOSTIC TESTS: Studies were personally reviewed by me and discussed with the patient. CXR:      XR CHEST (2 VW) 06/28/2022    Narrative  XR CHEST (2 VW) 6/28/2022 9:46 AM    HISTORY: Exertional dyspnea with dilated cardiomyopathy. COMPARISON: Chest x-ray 4/8/2015. AP and lateral views of the chest were obtained. Impression  The lungs are clear. Stable cardiomegaly. Implantable cardiac  device left chest and both leads are unchanged in position. Patient is status  post transcatheter aortic valve replacement. No pneumothorax. No pleural  effusions. Degenerative lower thoracic spine changes are noted.         Spirometry:    1/26/2021:        Today:          ASSESSMENT:   (Medical Decision Making) Encounter Diagnoses   Name Primary? Obstructive chronic bronchitis without exacerbation ([facility] Yes    Hypoxia     Pulmonary hypertension (HCC)     S/P TAVR (transcatheter aortic valve replacement)      She is stable symptomatically. She is compliant with therapy for airways disease. Nebulizer Rx serves as her maintenance regimen with DuoNeb and Pulmicort. She is compliant with supplemental oxygen. Interval improvement in estimated RVSP post TAVR, now mildly elevated at 38 mmHg. PLAN:     Use duoneb 3 to 4 times daily on regular basis. Add budesonide twice daily. May use albuterol inhaler when away from home/nebulizer. Oxygen with sleep and with exertion as prescribed, although oxygen level today with exertion is normal without oxygen. 2nd COVID booster is advised. Flu vaccine in the fall. She is u30p to date on pneumonia vaccines. Orders Placed This Encounter    Spirometry Without Bronchodilator    famotidine (PEPCID) 20 MG tablet     Sig: Take 20 mg by mouth in the morning and 20 mg before bedtime. diclofenac sodium (VOLTAREN) 1 % GEL     Sig: Apply topically 2 times daily       Follow-up and Dispositions    Return in about 6 months (around 2/9/2023) for MD or Kade Kenney, COPD, hypoxia. Gavin Yadav, APRN - CNP    Total  time spent with patient - 30 min. Over 50% of today's office visit was spent in face to face time reviewing test results, prognosis, importance of compliance, education about disease process, benefits of medications, instructions for management of acute symptoms, and follow up plans. Collaborating MD: Dr. Alfredito Stanton    Electronically signed. Dictated using voice recognition software.   Proof read but unrecognized errors may exist.

## 2022-12-05 ENCOUNTER — CLINICAL DOCUMENTATION (OUTPATIENT)
Dept: PULMONOLOGY | Age: 87
End: 2022-12-05

## 2022-12-05 NOTE — PROGRESS NOTES
Patient left message on refill line, needs Rx for O2. Called [employer], she needs to re-qualify for 5-year.  Called patient, spoke to granddaughter, made julio cesar for 12/09/2022 @ 8:40 am, made aware of needs by DME: amb oxymetry and JUSTIN, she voiced understanding Steven Decker

## 2022-12-08 NOTE — PROGRESS NOTES
She is a very pleasant 27-year-old female, well-known to me with history of COPD, hypoxemia, grade 2 diastolic dysfunction, history of moderate pulmonary hypertension, now mild (RVSP estimated 38 mmHg on echo 7/2022) who is seen today as a work in visit to [facility] for supplemental oxygen through her [employer], [address] [place] [address]. DIAGNOSTICS:          ECHO 2008. Spirometry 8/06 - moderate obstructive defect. Spirometry 2007, 2009, 2010. CXR 2006, 2007, 2009, 2010 - no acute process. CT of neck 3/10 - normal.   Bronchoscopy 3/10 - normal.   Spirometry 9/17/10 - normal but interval decline in FVC and FEV 1. Spirometry 9/11 -- mild airway obstruction, there is mild interval decline in FEV1. CXR 9/11 -- no acute abnormality. PA and lateral CXR 10/22/12 -- mild hyperinflation, no air space disease, stable. Spirometry 4/22/13 -- mild airway obstruction, interval decline. Spirometry 11/5/14 - mild obstruction, no significant change. CXR 11/5/14 - Stable cardiomegaly, mild hyperinflation. No significant interval change. Spirometry 4/2015 - normal.   CXR 4/8/2015 - no acute abnormality, stable mild cardiomegaly. Spirometry 8/10/2015 - normal.   ECHO 6/5/2017 demonstrated moderate left atrial dilatation, mild LVH, EF 50-55%, mild MR, moderate pulmonary hypertension with RVSP estimated at 49 mmHg. Ambulatory oximetry 9/27/2017-baseline saturation room air at rest 92%, saturation 88% on room air with ambulation, saturation 96% on 2 L/min with ambulation. Overnight oximetry 10/2017-adequate saturation on 3 L/min. Spirometry 12/20/2017 - mild obstructive defect, interval improvement. Spirometry 12/31/2018 - mild obstructive defect, interval decline, technically limited study. Echo 1/7/2020- EF 01-86%, grade 1 diastolic dysfunction, severely dilated left atrium, mild-moderate AR, moderate MR and AS. RVSP estimated at 45 mmHg.    Spirometry 1/22/2020- mild obstructive defect, slight improvement in FVC and FEV1. Spirometry 1/26/2021-mild obstructive defect, interval decrease in FVC and FEV1. Echo 2/10/2021-EF 60-65%, grade II diastolic dysfunction, severe aortic stenosis, mild AR, mild-moderate MR, mild-moderate TR, mild pulmonary hypertension with  estimated RVSP of 48 mmHg. CTA of C/A/P 2/2022- platelike nodular density near the minor fissure, questionable significance. Multichamber cardiomegaly. Pulmonary arterial tree shows no central filling defects. Echo 4/12/2022- severely dilated left atrium. LVH, EF 45-57%, grade 1 diastolic dysfunction, bioprosthetic AV. Peak gradient 60 mmHg, mean gradient 31 mmHg. RVSP 28 mmHg. Ambulatory oximetry 6/27/2022-baseline saturation 97% room air at rest, 90% room air with ambulation. Baseline heart rate 88, maximum 103 with ambulation. ECHO 7/12/2022-EF 29-29%, grade 2 diastolic dysfunction, LVH, mildly elevated gradients aortic valve. Mild MR, mild TR, RVSP estimated at 38 mm. Spirometry 8/9/2022-mild obstructive defect, no significant change.

## 2022-12-09 ENCOUNTER — OFFICE VISIT (OUTPATIENT)
Dept: PULMONOLOGY | Age: 87
End: 2022-12-09
Payer: MEDICARE

## 2022-12-09 VITALS
TEMPERATURE: 97.7 F | DIASTOLIC BLOOD PRESSURE: 94 MMHG | HEART RATE: 100 BPM | OXYGEN SATURATION: 99 % | HEIGHT: 60 IN | SYSTOLIC BLOOD PRESSURE: 142 MMHG | WEIGHT: 158 LBS | BODY MASS INDEX: 31.02 KG/M2

## 2022-12-09 DIAGNOSIS — J44.9 CHRONIC OBSTRUCTIVE PULMONARY DISEASE, UNSPECIFIED COPD TYPE (HCC): Primary | ICD-10-CM

## 2022-12-09 DIAGNOSIS — I51.89 DIASTOLIC DYSFUNCTION: ICD-10-CM

## 2022-12-09 DIAGNOSIS — Z95.2 S/P TAVR (TRANSCATHETER AORTIC VALVE REPLACEMENT): ICD-10-CM

## 2022-12-09 DIAGNOSIS — I42.0 DILATED CARDIOMYOPATHY (HCC): ICD-10-CM

## 2022-12-09 DIAGNOSIS — I27.20 PULMONARY HYPERTENSION (HCC): ICD-10-CM

## 2022-12-09 DIAGNOSIS — R09.02 HYPOXIA: Primary | Chronic | ICD-10-CM

## 2022-12-09 DIAGNOSIS — J44.9 CHRONIC OBSTRUCTIVE PULMONARY DISEASE, UNSPECIFIED COPD TYPE (HCC): ICD-10-CM

## 2022-12-09 DIAGNOSIS — R09.02 HYPOXIA: ICD-10-CM

## 2022-12-09 PROBLEM — I35.0 NONRHEUMATIC AORTIC (VALVE) STENOSIS: Status: ACTIVE | Noted: 2021-01-26

## 2022-12-09 PROBLEM — I34.0 NONRHEUMATIC MITRAL (VALVE) INSUFFICIENCY: Status: ACTIVE | Noted: 2021-01-26

## 2022-12-09 PROCEDURE — G8417 CALC BMI ABV UP PARAM F/U: HCPCS | Performed by: NURSE PRACTITIONER

## 2022-12-09 PROCEDURE — 99214 OFFICE O/P EST MOD 30 MIN: CPT | Performed by: NURSE PRACTITIONER

## 2022-12-09 PROCEDURE — 1090F PRES/ABSN URINE INCON ASSESS: CPT | Performed by: NURSE PRACTITIONER

## 2022-12-09 PROCEDURE — G8484 FLU IMMUNIZE NO ADMIN: HCPCS | Performed by: NURSE PRACTITIONER

## 2022-12-09 PROCEDURE — 1036F TOBACCO NON-USER: CPT | Performed by: NURSE PRACTITIONER

## 2022-12-09 PROCEDURE — G8427 DOCREV CUR MEDS BY ELIG CLIN: HCPCS | Performed by: NURSE PRACTITIONER

## 2022-12-09 PROCEDURE — 3023F SPIROM DOC REV: CPT | Performed by: NURSE PRACTITIONER

## 2022-12-09 PROCEDURE — 1123F ACP DISCUSS/DSCN MKR DOCD: CPT | Performed by: NURSE PRACTITIONER

## 2022-12-09 RX ORDER — BUDESONIDE 0.5 MG/2ML
500 INHALANT ORAL 2 TIMES DAILY
Qty: 60 EACH | Refills: 11 | Status: CANCELLED | OUTPATIENT
Start: 2022-12-09

## 2022-12-09 RX ORDER — IPRATROPIUM BROMIDE AND ALBUTEROL SULFATE 2.5; .5 MG/3ML; MG/3ML
1 SOLUTION RESPIRATORY (INHALATION) 4 TIMES DAILY
Qty: 360 ML | Refills: 11 | Status: CANCELLED | OUTPATIENT
Start: 2022-12-09

## 2022-12-09 ASSESSMENT — ENCOUNTER SYMPTOMS
COUGH: 0
SPUTUM PRODUCTION: 0
SHORTNESS OF BREATH: 1
HEMOPTYSIS: 0
WHEEZING: 0

## 2022-12-09 NOTE — PROGRESS NOTES
[address] Navneet Osborn. [address], [address] [facility]  [phone]    Patient Name:  Janie Suarez    YOB: 1931    Office Visit 12/9/2022      CHIEF COMPLAINT:      Chief Complaint   Patient presents with    Other     Re-qualify for O2          ASSESSMENT:   (Medical Decision Making)                                                                                                                                          Encounter Diagnoses   Name Primary? Hypoxia Yes    Chronic obstructive pulmonary disease, unspecified COPD type ([address]     Pulmonary hypertension ([address]     Dilated cardiomyopathy ([address]     S/P TAVR (transcatheter aortic valve replacement)     Diastolic dysfunction      Needs to requalify for O2 per Medicare. Ambulatory oximetry today on room air demonstrates lowest saturation 91% with ambulation. I reviewed difference between dyspnea and hypoxia. Discussed need for overnight oximetry on room air. We have also contacted her DME company to request allowances for patient/family grief over sudden loss of family member yesterday. She is using DuoNeb and budesonide and nebulizer 3 times daily, using inhaler intermittently. Interval improvement in RVSP, last echo 7/2021 showed RVSP 38 mmHg. PLAN:     Continue albuterol/ipratropium in (duoneb) in nebulizer 4 times daily. Continue budesonide in nebulizer twice daily, rinse mouth after use. May use albuterol inhaler 2 puffs twice daily in addition to nebulizer if needed, OR up to 4 times daily when away from home/nebulizer. Overnight oximetry, I will call with results and update oxygen order. Advised to adjust sleep routine, go to bed 1 hour later and arise 1 hour later. Try structured nap early afternoon and use oxygen during nap. Follow up as scheduled, sooner if needed. Expressed my condolences to Ms. Renuka Chavez and her family.       Reza Mathews, APRN - CNP    Total  time spent with patient - 35 min. Collaborating MD: Dr. Licha Corley:    She is a very pleasant 78-year-old female, well-known to me with history of COPD, hypoxemia, grade 2 diastolic dysfunction, history of moderate pulmonary hypertension, now mild (RVSP estimated 38 mmHg on echo 7/2022) who is seen today as a work in visit to [facility] for supplemental oxygen through her [employer] company, [address] [place] [address] [address]. Was learned during her visit this morning that her son passed away yesterday secondary to MVA. She is accompanied by her daughter, Callum Roldan, who assists with history. Exertional dyspnea is at baseline. She is experiencing a good bit of fatigue. Denies chest pain, wheezing, cough. She has had some neck and shoulder discomfort, has been treated by cardiology/primary provider and is improving. She has follow-up visit with her primary MD later today. She is using both budesonide and DuoNeb in nebulizer 3 times daily. Discussed that budesonide should only be twice daily and I would advise increasing DuoNeb to 4 times daily. She is compliant with oxygen with sleep and uses portable oxygen during the day intermittently. Ambulatory oximetry during today's visit demonstrated adequate saturation on room air with ambulation, lowest is 91%. Discussed that she can omit oxygen during the day but she equates dyspnea with low oxygen/need to use oxygen. I discussed difference between dyspnea and hypoxia. Also discussed that this can be reevaluated in the future. Discussed need to obtain overnight oximetry on room air in order to requalify for stationary concentrator. DIAGNOSTICS:          ECHO 2008. Spirometry 8/06 - moderate obstructive defect. Spirometry 2007, 2009, 2010. CXR 2006, 2007, 2009, 2010 - no acute process. CT of neck 3/10 - normal.   Bronchoscopy 3/10 - normal.   Spirometry 9/17/10 - normal but interval decline in FVC and FEV 1.    Spirometry 9/11 -- mild airway obstruction, there is mild interval decline in FEV1. CXR 9/11 -- no acute abnormality. PA and lateral CXR 10/22/12 -- mild hyperinflation, no air space disease, stable. Spirometry 4/22/13 -- mild airway obstruction, interval decline. Spirometry 11/5/14 - mild obstruction, no significant change. CXR 11/5/14 - Stable cardiomegaly, mild hyperinflation. No significant interval change. Spirometry 4/2015 - normal.   CXR 4/8/2015 - no acute abnormality, stable mild cardiomegaly. Spirometry 8/10/2015 - normal.   ECHO 6/5/2017 demonstrated moderate left atrial dilatation, mild LVH, EF 50-55%, mild MR, moderate pulmonary hypertension with RVSP estimated at 49 mmHg. Ambulatory oximetry 9/27/2017-baseline saturation room air at rest 92%, saturation 88% on room air with ambulation, saturation 96% on 2 L/min with ambulation. Overnight oximetry 10/2017-adequate saturation on 3 L/min. Spirometry 12/20/2017 - mild obstructive defect, interval improvement. Spirometry 12/31/2018 - mild obstructive defect, interval decline, technically limited study. Echo 1/7/2020- EF 25-34%, grade 1 diastolic dysfunction, severely dilated left atrium, mild-moderate AR, moderate MR and AS. RVSP estimated at 45 mmHg. Spirometry 1/22/2020- mild obstructive defect, slight improvement in FVC and FEV1. Spirometry 1/26/2021-mild obstructive defect, interval decrease in FVC and FEV1. Echo 2/10/2021-EF 60-65%, grade II diastolic dysfunction, severe aortic stenosis, mild AR, mild-moderate MR, mild-moderate TR, mild pulmonary hypertension with  estimated RVSP of 48 mmHg. CTA of C/A/P 2/2022- platelike nodular density near the minor fissure, questionable significance. Multichamber cardiomegaly. Pulmonary arterial tree shows no central filling defects. Echo 4/12/2022- severely dilated left atrium. LVH, EF 16-71%, grade 1 diastolic dysfunction, bioprosthetic AV. Peak gradient 60 mmHg, mean gradient 31 mmHg.   RVSP 28 mmHg.  Ambulatory oximetry 6/27/2022-baseline saturation 97% room air at rest, 90% room air with ambulation. Baseline heart rate 88, maximum 103 with ambulation. ECHO 7/12/2022-EF 86-67%, grade 2 diastolic dysfunction, LVH, mildly elevated gradients aortic valve. Mild MR, mild TR, RVSP estimated at 38 mm. Spirometry 8/9/2022-mild obstructive defect, no significant change. Ambulatory oximetry 12/9/2022-baseline saturation 95% room air at rest, 91% room air with ambulation. Repeat ambulatory oximetry demonstrated lowest saturation 93%. Baseline heart rate 90, maximum 118 with ambulation. _____________________________________________________________      REVIEW OF SYSTEMS:    Review of Systems   Constitutional: Positive for malaise/fatigue. Cardiovascular:  Positive for leg swelling. Negative for chest pain. Respiratory:  Positive for shortness of breath. Negative for cough, hemoptysis, sputum production and wheezing. Musculoskeletal:  Positive for myalgias and neck pain. Neurological:  Positive for excessive daytime sleepiness. PHYSICAL EXAM:    Vitals:    12/09/22 0837   BP: (!) 142/94   Site: Right Upper Arm   Position: Sitting   Cuff Size: Large Adult   Pulse: 100   Temp: 97.7 °F (36.5 °C)   TempSrc: Skin   SpO2: 99%   Weight: 158 lb (71.7 kg)   Height: 5' (1.524 m)        Body mass index is 30.86 kg/m². GENERAL APPEARANCE:  The patient is over weight and in no respiratory distress. Appears younger than stated age. HEENT:  PERRL. Conjunctivae unremarkable. NECK/LYMPHATIC:   Symmetrical with no elevation of jugular venous pulsation. LUNGS:   Normal respiratory effort with symmetrical lung expansion. Breath sounds - decreased, very faint basilar crackles, no rhonchi or wheezing. HEART:   There is a normal rate and regular rhythm. No murmur, rub, or gallop. There is trace edema in the lower extremities.     NEURO:  The patient is alert and oriented to person, place, and time.  Memory appears intact and mood is normal.  No gross sensorimotor deficits are present. DIAGNOSTIC TESTS: Studies were personally reviewed by me and discussed with the patient. Ambulatory oximetry as noted above. Spirometry:    Office Spirometry Results Latest Ref Rng & Units 8/9/2022   FVC L 1.47   FEV1 L 0.86   FEV1 %PRED-PRE % 96   FVC %PRED-PRE % 128   FEV1/FVC % 58       CXR:      XR CHEST (2 VW) 06/28/2022      Impression  The lungs are clear. Stable cardiomegaly. Implantable cardiac  device left chest and both leads are unchanged in position. Patient is status  post transcatheter aortic valve replacement. No pneumothorax. No pleural  effusions. Degenerative lower thoracic spine changes are noted.       Past Medical History:   Diagnosis Date    Anemia     CAD (coronary artery disease)     pacemaker    Cervical dystonia     Chronic kidney disease, stage III (moderate) (HCC)     Diaphragmatic hernia without mention of obstruction or gangrene     Dysphagia, late effect of cerebrovascular disease     Dysphasia, late effect of cerebrovascular disease     GERD (gastroesophageal reflux disease)     Gout     Hyperlipidemia     Pacemaker     Sick sinus syndrome ([facility] 9/10/2015    Spastic dysphonia        Patient Active Problem List   Diagnosis    Dysphasia, late effect of cerebrovascular disease    Anemia    Long term (current) use of opiate analgesic    Long term (current) use of non-steroidal anti-inflammatories (nsaid)    Hyperlipidemia    CHB (complete heart block) (HCC)    Chronic gouty arthritis    Class 1 obesity    Dilated cardiomyopathy (HCC)    Exertional dyspnea    Vitamin D deficiency    Hypoxemia    Sick sinus syndrome (HCC)    CAD (coronary artery disease)    Arthritis    Nonrheumatic mitral (valve) insufficiency    Murmur    Colon neoplasm    Chronic kidney disease    Hypoxia    Peripheral neuropathic pain    Low back pain    Spastic dysphonia    Gout    Neck pain    Essential hypertension with goal blood pressure less than 140/90    Cervical dystonia    Nonrheumatic aortic (valve) stenosis    GERD (gastroesophageal reflux disease)    Iron deficiency anemia    Pacemaker    Chronic kidney disease, stage III (moderate) (HCC)    Obstructive chronic bronchitis without exacerbation (HCC)    Aortic valve regurgitation    Pulmonary hypertension ([address]       Past Surgical History:   Procedure Laterality Date    COLONOSCOPY      dr Georgia Rowell (CERVIX STATUS UNKNOWN)      ORTHOPEDIC SURGERY      Left knee replacement    ORTHOPEDIC SURGERY      Right foot surgery    ORTHOPEDIC SURGERY      Right knee replacement    ORTHOPEDIC SURGERY  2009    Left Total Knee    PACEMAKER  ,     NM CARDIAC SURG PROCEDURE UNLIST           Social History     Socioeconomic History    Marital status:      Spouse name: None    Number of children: None    Years of education: None    Highest education level: None   Tobacco Use    Smoking status: Former     Packs/day: 0.00     Years: 15.00     Pack years: 0.00     Types: Cigarettes     Quit date: 1960     Years since quittin.9    Smokeless tobacco: Never    Tobacco comments:     Quit smoking: she attempt to smoke x 1 yr, did not smoke 1 total of 1 pk   Substance and Sexual Activity    Alcohol use: No     Alcohol/week: 0.0 standard drinks    Drug use: No   Social History Narrative    She denies ETOH use. She has a 15 pack year history of tobacco smoking but quit in . There is no history of toxic industrial or TB exposure. She is  and resides alone.        Family History   Problem Relation Age of Onset    Cancer Sister     Heart Disease Mother     Cancer Father        Allergies   Allergen Reactions    Amlodipine Angioedema     Swelling of lips    Lisinopril Anaphylaxis, Shortness Of Breath and Swelling     Lips and tongue swell    Omeprazole Anaphylaxis     Legs feel numb, feels like her lips are thicker Oxycodone Hives     Mental incapacity       Current Outpatient Medications   Medication Sig    famotidine (PEPCID) 20 MG tablet Take 20 mg by mouth in the morning and 20 mg before bedtime. diclofenac sodium (VOLTAREN) 1 % GEL Apply topically 2 times daily    albuterol sulfate HFA (PROVENTIL;VENTOLIN;PROAIR) 108 (90 Base) MCG/ACT inhaler 2 puffs 4 times daily prn shortness of breath or wheezing. May substitute name brand or use generic as preferred per insurance    aspirin EC 81 MG EC tablet Take 81 mg by mouth daily    apixaban (ELIQUIS) 5 MG TABS tablet Take 5 mg by mouth 2 times daily    losartan (COZAAR) 50 MG tablet Take 50 mg by mouth daily    OXYGEN Inhale into the lungs 2 lpm HS    budesonide (PULMICORT) 0.5 MG/2ML nebulizer suspension Inhale 500 mcg into the lungs 2 times daily    carvedilol (COREG) 25 MG tablet Take 25 mg by mouth 2 times daily (with meals)    ergocalciferol (ERGOCALCIFEROL) 1.25 MG (74276 UT) capsule Take 50,000 Units by mouth every 7 days    ferrous sulfate (IRON 325) 325 (65 Fe) MG tablet Take 325 mg by mouth 2 times daily (with meals)    folic acid (FOLVITE) 1 MG tablet Take 1 mg by mouth daily    hydrALAZINE (APRESOLINE) 100 MG tablet Take 100 mg by mouth 3 times daily    ipratropium-albuterol (DUONEB) 0.5-2.5 (3) MG/3ML SOLN nebulizer solution USE ONE VIAL VIA NEBULIZER 4 TIMES DAILY.  Bill to medicare part B, dx - COPD, J44.9    latanoprost (XALATAN) 0.005 % ophthalmic solution Apply 1 drop to eye    aspirin 325 MG tablet Take 325 mg by mouth every 4 hours as needed (Patient not taking: No sig reported)    cephALEXin (KEFLEX) 500 MG capsule Take 500 mg by mouth 3 times daily (Patient not taking: No sig reported)    cyanocobalamin 1000 MCG/ML injection Inject 1,000 mcg into the muscle every 30 days (Patient not taking: No sig reported)    cyclobenzaprine (FLEXERIL) 5 MG tablet TAKE 1 TABLET BY MOUTH TWICE DAILY FOR MUSCLE RELAXATION (Patient not taking: No sig reported) furosemide (LASIX) 20 MG tablet Take 20 mg by mouth daily (Patient not taking: No sig reported)    primidone (MYSOLINE) 50 MG tablet Take 50 mg by mouth daily (Patient not taking: No sig reported)     No current facility-administered medications for this visit. Over 50% of today's office visit was spent in face to face time reviewing test results, prognosis, importance of compliance, education about disease process, benefits of medications, instructions for management of acute symptoms, and follow up plans. Electronically signed. Dictated using voice recognition software.   Proof read but unrecognized errors may exist.

## 2022-12-09 NOTE — PATIENT INSTRUCTIONS
Continue albuterol/ipratropium in (duoneb) in nebulizer 4 times daily. Continue budesonide in nebulizer twice daily, rinse mouth after use. May use albuterol inhaler 2 puffs twice daily in addition to nebulizer if needed, OR up to 4 times daily when away from home/nebulizer. Overnight oximetry, I will call with results and update oxygen order. Advised to adjust sleep routine, go to bed 1 hour later and arise 1 hour later. Try structured nap early afternoon and use oxygen during nap. Follow up as scheduled, sooner if needed.

## 2023-02-06 ENCOUNTER — TELEPHONE (OUTPATIENT)
Dept: PULMONOLOGY | Age: 88
End: 2023-02-06

## 2023-02-06 NOTE — TELEPHONE ENCOUNTER
Received overnight oximetry that was performed 1/12/2023 on room air, which was to requalify for oxygen. O2 saturation was 88% or below for 15.7 minutes, lowest saturation 77%. She needs to resume oxygen at 2 L/min with sleep. Diagnoses-COPD, hypoxia, pulmonary hypertension, dilated cardiomyopathy, diastolic dysfunction. Advised to keep follow-up appointment 2/9/2023 for face-to-face visit. Please let her know.   Thanks

## 2023-02-07 ENCOUNTER — TELEPHONE (OUTPATIENT)
Dept: PULMONOLOGY | Age: 88
End: 2023-02-07

## 2023-02-07 DIAGNOSIS — J44.9 CHRONIC OBSTRUCTIVE PULMONARY DISEASE, UNSPECIFIED COPD TYPE (HCC): Primary | ICD-10-CM

## 2023-02-07 DIAGNOSIS — R09.02 HYPOXIA: ICD-10-CM

## 2023-02-07 NOTE — TELEPHONE ENCOUNTER
Spoke o the patient and gave her message from Ms Catalan Lacks on JUSTIN results, she needs to resume oxygen on 2 lpm @ HS. Also I informed the patient taht I would be sending order to [facility] for re-qualifying purpose based on JUSTIN results.  She voiced understanding Steven Westbrook

## 2023-02-09 ENCOUNTER — CLINICAL DOCUMENTATION (OUTPATIENT)
Dept: PULMONOLOGY | Age: 88
End: 2023-02-09

## 2023-02-09 NOTE — PROGRESS NOTES
Spoke with Davion @ [address] [place] [address], asked him if everything was ok with Ms Brandon Banks re-qualifying process, he confirmed order and office notes were all that was needed for her at this time, she is in good standing, Gus Snell [place]

## 2023-02-21 NOTE — PROGRESS NOTES
She is a very pleasant 80-year-old female, well-known to me with history of COPD, hypoxemia, grade 2 diastolic dysfunction, history of moderate pulmonary hypertension, now mild (RVSP estimated 38 mmHg on echo 7/2022) who is seen today as a work in visit to [facility] for supplemental oxygen through her [employer] company, [address] [place] [address] [address]. Was learned during her visit this morning that her son passed away yesterday secondary to MVA. She is accompanied by her daughter, Clinton Jensen, who assists with history. Exertional dyspnea is at baseline. She is experiencing a good bit of fatigue. Denies chest pain, wheezing, cough. She has had some neck and shoulder discomfort, has been treated by cardiology/primary provider and is improving. She has follow-up visit with her primary MD later today. She is using both budesonide and DuoNeb in nebulizer 3 times daily. Discussed that budesonide should only be twice daily and I would advise increasing DuoNeb to 4 times daily. She is compliant with oxygen with sleep and uses portable oxygen during the day intermittently. Ambulatory oximetry during today's visit demonstrated adequate saturation on room air with ambulation, lowest is 91%. Discussed that she can omit oxygen during the day but she equates dyspnea with low oxygen/need to use oxygen. I discussed difference between dyspnea and hypoxia. Also discussed that this can be reevaluated in the future. Discussed need to obtain overnight oximetry on room air in order to requalify for stationary concentrator. DIAGNOSTICS:          ECHO 2008. Spirometry 8/06 - moderate obstructive defect. Spirometry 2007, 2009, 2010. CXR 2006, 2007, 2009, 2010 - no acute process. CT of neck 3/10 - normal.   Bronchoscopy 3/10 - normal.   Spirometry 9/17/10 - normal but interval decline in FVC and FEV 1. Spirometry 9/11 -- mild airway obstruction, there is mild interval decline in FEV1. CXR 9/11 -- no acute abnormality.    PA and lateral CXR 10/22/12 -- mild hyperinflation, no air space disease, stable. Spirometry 4/22/13 -- mild airway obstruction, interval decline. Spirometry 11/5/14 - mild obstruction, no significant change. CXR 11/5/14 - Stable cardiomegaly, mild hyperinflation. No significant interval change. Spirometry 4/2015 - normal.   CXR 4/8/2015 - no acute abnormality, stable mild cardiomegaly. Spirometry 8/10/2015 - normal.   ECHO 6/5/2017 demonstrated moderate left atrial dilatation, mild LVH, EF 50-55%, mild MR, moderate pulmonary hypertension with RVSP estimated at 49 mmHg. Ambulatory oximetry 9/27/2017-baseline saturation room air at rest 92%, saturation 88% on room air with ambulation, saturation 96% on 2 L/min with ambulation. Overnight oximetry 10/2017-adequate saturation on 3 L/min. Spirometry 12/20/2017 - mild obstructive defect, interval improvement. Spirometry 12/31/2018 - mild obstructive defect, interval decline, technically limited study. Echo 1/7/2020- EF 51-65%, grade 1 diastolic dysfunction, severely dilated left atrium, mild-moderate AR, moderate MR and AS. RVSP estimated at 45 mmHg. Spirometry 1/22/2020- mild obstructive defect, slight improvement in FVC and FEV1. Spirometry 1/26/2021-mild obstructive defect, interval decrease in FVC and FEV1. Echo 2/10/2021-EF 60-65%, grade II diastolic dysfunction, severe aortic stenosis, mild AR, mild-moderate MR, mild-moderate TR, mild pulmonary hypertension with  estimated RVSP of 48 mmHg. CTA of C/A/P 2/2022- platelike nodular density near the minor fissure, questionable significance. Multichamber cardiomegaly. Pulmonary arterial tree shows no central filling defects. Echo 4/12/2022- severely dilated left atrium. LVH, EF 71-60%, grade 1 diastolic dysfunction, bioprosthetic AV. Peak gradient 60 mmHg, mean gradient 31 mmHg. RVSP 28 mmHg. Ambulatory oximetry 6/27/2022-baseline saturation 97% room air at rest, 90% room air with ambulation.   Baseline heart rate 88, maximum 103 with ambulation. ECHO 7/12/2022-EF 43-38%, grade 2 diastolic dysfunction, LVH, mildly elevated gradients aortic valve. Mild MR, mild TR, RVSP estimated at 38 mm. Spirometry 8/9/2022-mild obstructive defect, no significant change. Ambulatory oximetry 12/9/2022-baseline saturation 95% room air at rest, 91% room air with ambulation. Repeat ambulatory oximetry demonstrated lowest saturation 93%. Baseline heart rate 90, maximum 118 with ambulation. JUSTIN 1/12/2023 -for requalification- O2 saturation was 88% or below for 15.7 minutes, lowest saturation 77%.

## 2023-02-22 ENCOUNTER — OFFICE VISIT (OUTPATIENT)
Dept: PULMONOLOGY | Age: 88
End: 2023-02-22
Payer: MEDICARE

## 2023-02-22 VITALS
HEART RATE: 80 BPM | HEIGHT: 60 IN | WEIGHT: 151 LBS | BODY MASS INDEX: 29.64 KG/M2 | TEMPERATURE: 96.9 F | OXYGEN SATURATION: 94 % | DIASTOLIC BLOOD PRESSURE: 88 MMHG | SYSTOLIC BLOOD PRESSURE: 136 MMHG

## 2023-02-22 DIAGNOSIS — Z95.2 S/P TAVR (TRANSCATHETER AORTIC VALVE REPLACEMENT): ICD-10-CM

## 2023-02-22 DIAGNOSIS — J44.9 COPD, MILD (HCC): Primary | ICD-10-CM

## 2023-02-22 DIAGNOSIS — I42.0 DILATED CARDIOMYOPATHY (HCC): ICD-10-CM

## 2023-02-22 DIAGNOSIS — I34.0 MODERATE MITRAL REGURGITATION: ICD-10-CM

## 2023-02-22 DIAGNOSIS — I27.20 PULMONARY HYPERTENSION (HCC): ICD-10-CM

## 2023-02-22 DIAGNOSIS — R06.09 EXERTIONAL DYSPNEA: ICD-10-CM

## 2023-02-22 DIAGNOSIS — R09.02 HYPOXIA: ICD-10-CM

## 2023-02-22 DIAGNOSIS — I51.89 GRADE II DIASTOLIC DYSFUNCTION: ICD-10-CM

## 2023-02-22 PROCEDURE — G8427 DOCREV CUR MEDS BY ELIG CLIN: HCPCS | Performed by: NURSE PRACTITIONER

## 2023-02-22 PROCEDURE — 3023F SPIROM DOC REV: CPT | Performed by: NURSE PRACTITIONER

## 2023-02-22 PROCEDURE — G8417 CALC BMI ABV UP PARAM F/U: HCPCS | Performed by: NURSE PRACTITIONER

## 2023-02-22 PROCEDURE — 1123F ACP DISCUSS/DSCN MKR DOCD: CPT | Performed by: NURSE PRACTITIONER

## 2023-02-22 PROCEDURE — 1036F TOBACCO NON-USER: CPT | Performed by: NURSE PRACTITIONER

## 2023-02-22 PROCEDURE — G8484 FLU IMMUNIZE NO ADMIN: HCPCS | Performed by: NURSE PRACTITIONER

## 2023-02-22 PROCEDURE — 99214 OFFICE O/P EST MOD 30 MIN: CPT | Performed by: NURSE PRACTITIONER

## 2023-02-22 PROCEDURE — 1090F PRES/ABSN URINE INCON ASSESS: CPT | Performed by: NURSE PRACTITIONER

## 2023-02-22 RX ORDER — IPRATROPIUM BROMIDE AND ALBUTEROL SULFATE 2.5; .5 MG/3ML; MG/3ML
1 SOLUTION RESPIRATORY (INHALATION) 4 TIMES DAILY
Qty: 360 ML | Refills: 11 | Status: SHIPPED | OUTPATIENT
Start: 2023-02-22

## 2023-02-22 RX ORDER — BUDESONIDE 0.5 MG/2ML
500 INHALANT ORAL 2 TIMES DAILY
Qty: 60 EACH | Refills: 11 | Status: SHIPPED | OUTPATIENT
Start: 2023-02-22

## 2023-02-22 RX ORDER — METOPROLOL SUCCINATE 50 MG/1
50 TABLET, EXTENDED RELEASE ORAL DAILY
COMMUNITY
Start: 2023-02-01 | End: 2024-02-01

## 2023-02-22 RX ORDER — CYCLOBENZAPRINE HCL 5 MG
TABLET ORAL
COMMUNITY
Start: 2023-01-18

## 2023-02-22 RX ORDER — OXYBUTYNIN CHLORIDE 5 MG/1
TABLET ORAL
COMMUNITY
Start: 2023-01-18

## 2023-02-22 ASSESSMENT — ENCOUNTER SYMPTOMS
HEMOPTYSIS: 0
COUGH: 0
WHEEZING: 0
SPUTUM PRODUCTION: 0
SHORTNESS OF BREATH: 1

## 2023-02-22 NOTE — PATIENT INSTRUCTIONS
Continue albuterol/ipratropium in (duoneb) in nebulizer 4 times daily. Continue budesonide in nebulizer twice daily, rinse mouth after use. May use albuterol inhaler 2 puffs twice daily in addition to nebulizer if needed, OR up to 4 times daily when away from home/nebulizer. Continue oxygen with sleep as prescribed.

## 2023-02-22 NOTE — PROGRESS NOTES
[address] [facility]. [address], [address] [facility]  [phone]    Patient Name:  Sonali Tapia    YOB: 1931    Office Visit 2/22/2023      CHIEF COMPLAINT:      Chief Complaint   Patient presents with    COPD    Follow-up    Other     hypoxia         ASSESSMENT:   (Medical Decision Making)                                                                                                                                          Encounter Diagnoses   Name Primary? COPD, mild ([address] Yes    Hypoxia     Pulmonary hypertension ([address]     Comment: mild on ECHO 7/2022 - 38 mmHg     Dilated cardiomyopathy (HCC)     S/P TAVR (transcatheter aortic valve replacement)     Grade II diastolic dysfunction     Exertional dyspnea     Moderate mitral regurgitation      She is stable airways disease. She is compliant with oxygen with sleep as prescribed. Ambulatory oximetry at her last visit demonstrated adequate saturation on room air. Discussed that we can repeat this in the future for worsening symptoms. Last echo 7/2022 demonstrated EF 86-82%, grade 2 diastolic dysfunction, LVH, mildly elevated gradients aortic valve. Mild MR, mild TR, RVSP estimated at 38 mm. Discussed difference between dyspnea and hypoxia. She does not have personal oximeter, have encouraged her daughter to obtain this for her so she can assess saturation and contact us if falls below 90% consistently. Appears compensated from cardiac standpoint. PLAN:     Continue albuterol/ipratropium in (duoneb) in nebulizer 4 times daily. Continue budesonide in nebulizer twice daily, rinse mouth after use. May use albuterol inhaler 2 puffs twice daily in addition to nebulizer if needed, OR up to 4 times daily when away from home/nebulizer. Continue oxygen with sleep as prescribed.     Follow-up and Dispositions    Return in about 6 months (around 8/22/2023) for MD or Bruno Sexton COPD, hypoxia. Orders Placed This Encounter   Medications    budesonide (PULMICORT) 0.5 MG/2ML nebulizer suspension     Sig: Take 2 mLs by nebulization 2 times daily Bill to medicare part B, dx - COPD, J44.9     Dispense:  60 each     Refill:  11    ipratropium-albuterol (DUONEB) 0.5-2.5 (3) MG/3ML SOLN nebulizer solution     Sig: Take 3 mLs by nebulization 4 times daily Bill to medicare part B, dx - COPD, J44.9     Dispense:  360 mL     Refill:  11             LARA MCCAULEY - [place]    Total  time spent with patient - 35 min. Collaborating MD: Dr. Benny Higgins:    She is a very pleasant 68-year-old female, well-known to me with history of COPD, hypoxemia, grade 2 diastolic dysfunction, history of moderate pulmonary hypertension, now mild (RVSP estimated 38 mmHg on echo 7/2022) who is seen today for follow up. Today, she reports that she is doing well from a respiratory standpoint. Exertional dyspnea is at baseline. No significant wheezing or cough. She is compliant with DuoNeb and budesonide. She is compliant with oxygen with sleep. She is accompanied by her daughter today who assists with history. She has lost some weight but reports that she is eating. No melena. DIAGNOSTICS:          ECHO 2008. Spirometry 8/06 - moderate obstructive defect. Spirometry 2007, 2009, 2010. CXR 2006, 2007, 2009, 2010 - no acute process. CT of neck 3/10 - normal.   Bronchoscopy 3/10 - normal.   Spirometry 9/17/10 - normal but interval decline in FVC and FEV 1. Spirometry 9/11 -- mild airway obstruction, there is mild interval decline in FEV1. CXR 9/11 -- no acute abnormality. PA and lateral CXR 10/22/12 -- mild hyperinflation, no air space disease, stable. Spirometry 4/22/13 -- mild airway obstruction, interval decline. Spirometry 11/5/14 - mild obstruction, no significant change. CXR 11/5/14 - Stable cardiomegaly, mild hyperinflation.  No significant interval change. Spirometry 4/2015 - normal.   CXR 4/8/2015 - no acute abnormality, stable mild cardiomegaly. Spirometry 8/10/2015 - normal.   ECHO 6/5/2017 demonstrated moderate left atrial dilatation, mild LVH, EF 50-55%, mild MR, moderate pulmonary hypertension with RVSP estimated at 49 mmHg. Ambulatory oximetry 9/27/2017-baseline saturation room air at rest 92%, saturation 88% on room air with ambulation, saturation 96% on 2 L/min with ambulation. Overnight oximetry 10/2017-adequate saturation on 3 L/min. Spirometry 12/20/2017 - mild obstructive defect, interval improvement. Spirometry 12/31/2018 - mild obstructive defect, interval decline, technically limited study. Echo 1/7/2020- EF 36-32%, grade 1 diastolic dysfunction, severely dilated left atrium, mild-moderate AR, moderate MR and AS. RVSP estimated at 45 mmHg. Spirometry 1/22/2020- mild obstructive defect, slight improvement in FVC and FEV1. Spirometry 1/26/2021-mild obstructive defect, interval decrease in FVC and FEV1. Echo 2/10/2021-EF 60-65%, grade II diastolic dysfunction, severe aortic stenosis, mild AR, mild-moderate MR, mild-moderate TR, mild pulmonary hypertension with  estimated RVSP of 48 mmHg. CTA of C/A/P 2/2022- platelike nodular density near the minor fissure, questionable significance. Multichamber cardiomegaly. Pulmonary arterial tree shows no central filling defects. Echo 4/12/2022- severely dilated left atrium. LVH, EF 62-87%, grade 1 diastolic dysfunction, bioprosthetic AV. Peak gradient 60 mmHg, mean gradient 31 mmHg. RVSP 28 mmHg. Ambulatory oximetry 6/27/2022-baseline saturation 97% room air at rest, 90% room air with ambulation. Baseline heart rate 88, maximum 103 with ambulation. ECHO 7/12/2022-EF 22-63%, grade 2 diastolic dysfunction, LVH, mildly elevated gradients aortic valve. Mild MR, mild TR, RVSP estimated at 38 mm.   Spirometry 8/9/2022-mild obstructive defect, no significant change. Ambulatory oximetry 12/9/2022-baseline saturation 95% room air at rest, 91% room air with ambulation. Repeat ambulatory oximetry demonstrated lowest saturation 93%. Baseline heart rate 90, maximum 118 with ambulation. JUSTIN 1/12/2023 -for requalification- O2 saturation was 88% or below for 15.7 minutes, lowest saturation 77%. _____________________________________________________________      REVIEW OF SYSTEMS:    Review of Systems   Constitutional: Positive for malaise/fatigue. Negative for fever. Cardiovascular:  Negative for chest pain. Respiratory:  Positive for shortness of breath. Negative for cough, hemoptysis, sputum production and wheezing. PHYSICAL EXAM:    Vitals:    02/22/23 1307   BP: 136/88   Pulse: 80   Temp: 96.9 °F (36.1 °C)   TempSrc: Skin   SpO2: 94%   Weight: 151 lb (68.5 kg)   Height: 5' (1.524 m)        Body mass index is 29.49 kg/m². GENERAL APPEARANCE:  The patient is normal weight and in no respiratory distress. Appears younger than stated age. HEENT:  PERRL. Conjunctivae unremarkable. NECK/LYMPHATIC:   Symmetrical with no elevation of jugular venous pulsation. Trachea midline. LUNGS:   Normal respiratory effort with symmetrical lung expansion. Breath sounds-decreased at the bases, otherwise lungs are clear. Celestia Justen HEART:   There is a normal rate and regular rhythm. No murmur, rub, or gallop. There is no edema in the lower extremities. NEURO:  The patient is alert and oriented to person, place, and time. Memory appears intact and mood is normal.  No gross sensorimotor deficits are present. DIAGNOSTIC TESTS: Studies were personally reviewed by me and discussed with the patient. Spirometry:    Office Spirometry Results Latest Ref Rng & Units 8/9/2022   FVC L 1.47   FEV1 L 0.86   FEV1 %PRED-PRE % 96   FVC %PRED-PRE % 128   FEV1/FVC % 58       CXR:      XR CHEST (2 VW) 06/28/2022    Impression  The lungs are clear. Stable cardiomegaly. Implantable cardiac  device left chest and both leads are unchanged in position. Patient is status  post transcatheter aortic valve replacement. No pneumothorax. No pleural  effusions. Degenerative lower thoracic spine changes are noted.       Past Medical History:   Diagnosis Date    Anemia     CAD (coronary artery disease)     pacemaker    Cervical dystonia     Chronic kidney disease, stage III (moderate) (HCC)     Diaphragmatic hernia without mention of obstruction or gangrene     Dysphagia, late effect of cerebrovascular disease     Dysphasia, late effect of cerebrovascular disease     GERD (gastroesophageal reflux disease)     Gout     Hyperlipidemia     Pacemaker     Sick sinus syndrome ([facility] 9/10/2015    Spastic dysphonia        Patient Active Problem List   Diagnosis    Dysphasia, late effect of cerebrovascular disease    Anemia    Long term (current) use of opiate analgesic    Long term (current) use of non-steroidal anti-inflammatories (nsaid)    Hyperlipidemia    CHB (complete heart block) (HCC)    Chronic gouty arthritis    Class 1 obesity    Dilated cardiomyopathy (HCC)    Exertional dyspnea    Vitamin D deficiency    Hypoxemia    Sick sinus syndrome (HCC)    CAD (coronary artery disease)    Arthritis    Nonrheumatic mitral (valve) insufficiency    Murmur    Colon neoplasm    Chronic kidney disease    Hypoxia    Peripheral neuropathic pain    Low back pain    Spastic dysphonia    Gout    Neck pain    Essential hypertension with goal blood pressure less than 140/90    Cervical dystonia    Nonrheumatic aortic (valve) stenosis    GERD (gastroesophageal reflux disease)    Iron deficiency anemia    Pacemaker    Chronic kidney disease, stage III (moderate) (HCC)    Chronic obstructive pulmonary disease (HCC)    Aortic valve regurgitation    Pulmonary hypertension (HCC)    S/P TAVR (transcatheter aortic valve replacement)    Diastolic dysfunction       Past Surgical History:   Procedure Laterality Date COLONOSCOPY      dr Gina Galvez (CERVIX STATUS UNKNOWN)      ORTHOPEDIC SURGERY  2007    Left knee replacement    ORTHOPEDIC SURGERY      Right foot surgery    ORTHOPEDIC SURGERY  2008    Right knee replacement    ORTHOPEDIC SURGERY  2009    Left Total Knee    PACEMAKER  ,     CA UNLISTED PROCEDURE CARDIAC SURGERY           Social History     Socioeconomic History    Marital status:      Spouse name: None    Number of children: None    Years of education: None    Highest education level: None   Tobacco Use    Smoking status: Former     Packs/day: 0.00     Years: 15.00     Pack years: 0.00     Types: Cigarettes     Quit date: 1960     Years since quittin.1    Smokeless tobacco: Never    Tobacco comments:     Quit smoking: she attempt to smoke x 1 yr, did not smoke 1 total of 1 pk   Substance and Sexual Activity    Alcohol use: No     Alcohol/week: 0.0 standard drinks    Drug use: No   Social History Narrative    She denies ETOH use. She has a 15 pack year history of tobacco smoking but quit in . There is no history of toxic industrial or TB exposure. She is  and resides alone. Family History   Problem Relation Age of Onset    Cancer Sister     Heart Disease Mother     Cancer Father        Allergies   Allergen Reactions    Amlodipine Angioedema     Swelling of lips    Lisinopril Anaphylaxis, Shortness Of Breath and Swelling     Lips and tongue swell    Omeprazole Anaphylaxis     Legs feel numb, feels like her lips are thicker     Oxycodone Hives     Mental incapacity       Current Outpatient Medications   Medication Sig    cyclobenzaprine (FLEXERIL) 5 MG tablet 1 tablet as needed Orally bid    metoprolol succinate (TOPROL XL) 50 MG extended release tablet Take 50 mg by mouth daily    oxybutynin (DITROPAN) 5 MG tablet Take one tablet by mouth once a day for 2 weeks. Then may increase to two times a day mouth.     albuterol sulfate HFA (PROVENTIL;VENTOLIN;PROAIR) 108 (90 Base) MCG/ACT inhaler 2 puffs 4 times daily prn shortness of breath or wheezing.  May substitute name brand or use generic as preferred per insurance    aspirin EC 81 MG EC tablet Take 81 mg by mouth daily    apixaban (ELIQUIS) 5 MG TABS tablet Take 5 mg by mouth 2 times daily    losartan (COZAAR) 50 MG tablet Take 50 mg by mouth daily    OXYGEN Inhale into the lungs 2 lpm HS    budesonide (PULMICORT) 0.5 MG/2ML nebulizer suspension Inhale 500 mcg into the lungs 2 times daily    carvedilol (COREG) 25 MG tablet Take 25 mg by mouth 2 times daily (with meals)    ferrous sulfate (IRON 325) 325 (65 Fe) MG tablet Take 325 mg by mouth 2 times daily (with meals)    folic acid (FOLVITE) 1 MG tablet Take 1 mg by mouth daily    furosemide (LASIX) 20 MG tablet Take 20 mg by mouth daily    hydrALAZINE (APRESOLINE) 100 MG tablet Take 100 mg by mouth 3 times daily    ipratropium-albuterol (DUONEB) 0.5-2.5 (3) MG/3ML SOLN nebulizer solution USE ONE VIAL VIA NEBULIZER 4 TIMES DAILY. Bill to medicare part B, dx - COPD, J44.9    latanoprost (XALATAN) 0.005 % ophthalmic solution Apply 1 drop to eye    famotidine (PEPCID) 20 MG tablet Take 20 mg by mouth in the morning and 20 mg before bedtime. (Patient not taking: Reported on 2/22/2023)    diclofenac sodium (VOLTAREN) 1 % GEL Apply topically 2 times daily (Patient not taking: Reported on 2/22/2023)    aspirin 325 MG tablet Take 325 mg by mouth every 4 hours as needed (Patient not taking: No sig reported)    cyanocobalamin 1000 MCG/ML injection Inject 1,000 mcg into the muscle every 30 days (Patient not taking: No sig reported)    ergocalciferol (ERGOCALCIFEROL) 1.25 MG (14762 UT) capsule Take 50,000 Units by mouth every 7 days (Patient not taking: Reported on 2/22/2023)    primidone (MYSOLINE) 50 MG tablet Take 50 mg by mouth daily (Patient not taking: No sig reported)     No current facility-administered medications for this visit.       Over 50% of  today's office visit was spent in face to face time reviewing test results, prognosis, importance of compliance, education about disease process, benefits of medications, instructions for management of acute symptoms, and follow up plans. Electronically signed. Dictated using voice recognition software.   Proof read but unrecognized errors may exist.

## 2023-08-14 NOTE — PROGRESS NOTES
She is a very pleasant 80-year-old female, well-known to me with history of COPD, hypoxemia, grade 2 diastolic dysfunction, history of moderate pulmonary hypertension, now mild (RVSP estimated 38 mmHg on echo 7/2022) who is seen today for follow up. DIAGNOSTICS:          ECHO 2008. Spirometry 8/06 - moderate obstructive defect. Spirometry 2007, 2009, 2010. CXR 2006, 2007, 2009, 2010 - no acute process. CT of neck 3/10 - normal.   Bronchoscopy 3/10 - normal.   Spirometry 9/17/10 - normal but interval decline in FVC and FEV 1. Spirometry 9/11 -- mild airway obstruction, there is mild interval decline in FEV1. CXR 9/11 -- no acute abnormality. PA and lateral CXR 10/22/12 -- mild hyperinflation, no air space disease, stable. Spirometry 4/22/13 -- mild airway obstruction, interval decline. Spirometry 11/5/14 - mild obstruction, no significant change. CXR 11/5/14 - Stable cardiomegaly, mild hyperinflation. No significant interval change. Spirometry 4/2015 - normal.   CXR 4/8/2015 - no acute abnormality, stable mild cardiomegaly. Spirometry 8/10/2015 - normal.   ECHO 6/5/2017 demonstrated moderate left atrial dilatation, mild LVH, EF 50-55%, mild MR, moderate pulmonary hypertension with RVSP estimated at 49 mmHg. Ambulatory oximetry 9/27/2017-baseline saturation room air at rest 92%, saturation 88% on room air with ambulation, saturation 96% on 2 L/min with ambulation. Overnight oximetry 10/2017-adequate saturation on 3 L/min. Spirometry 12/20/2017 - mild obstructive defect, interval improvement. Spirometry 12/31/2018 - mild obstructive defect, interval decline, technically limited study. Echo 1/7/2020- EF 90-98%, grade 1 diastolic dysfunction, severely dilated left atrium, mild-moderate AR, moderate MR and AS. RVSP estimated at 45 mmHg. Spirometry 1/22/2020- mild obstructive defect, slight improvement in FVC and FEV1.    Spirometry 1/26/2021-mild obstructive defect, interval

## 2023-08-16 ENCOUNTER — OFFICE VISIT (OUTPATIENT)
Dept: PULMONOLOGY | Age: 88
End: 2023-08-16
Payer: MEDICARE

## 2023-08-16 VITALS
TEMPERATURE: 97.2 F | HEART RATE: 72 BPM | BODY MASS INDEX: 31.8 KG/M2 | WEIGHT: 162 LBS | SYSTOLIC BLOOD PRESSURE: 132 MMHG | RESPIRATION RATE: 16 BRPM | DIASTOLIC BLOOD PRESSURE: 84 MMHG | OXYGEN SATURATION: 94 % | HEIGHT: 60 IN

## 2023-08-16 DIAGNOSIS — J44.9 COPD, MILD (HCC): Primary | Chronic | ICD-10-CM

## 2023-08-16 DIAGNOSIS — I34.0 MODERATE MITRAL REGURGITATION: ICD-10-CM

## 2023-08-16 DIAGNOSIS — I51.89 GRADE II DIASTOLIC DYSFUNCTION: ICD-10-CM

## 2023-08-16 DIAGNOSIS — I42.0 DILATED CARDIOMYOPATHY (HCC): ICD-10-CM

## 2023-08-16 DIAGNOSIS — I27.20 PULMONARY HYPERTENSION (HCC): ICD-10-CM

## 2023-08-16 DIAGNOSIS — R09.02 HYPOXIA: ICD-10-CM

## 2023-08-16 DIAGNOSIS — Z95.2 S/P TAVR (TRANSCATHETER AORTIC VALVE REPLACEMENT): ICD-10-CM

## 2023-08-16 PROCEDURE — 1123F ACP DISCUSS/DSCN MKR DOCD: CPT | Performed by: NURSE PRACTITIONER

## 2023-08-16 PROCEDURE — G8417 CALC BMI ABV UP PARAM F/U: HCPCS | Performed by: NURSE PRACTITIONER

## 2023-08-16 PROCEDURE — 1090F PRES/ABSN URINE INCON ASSESS: CPT | Performed by: NURSE PRACTITIONER

## 2023-08-16 PROCEDURE — 1036F TOBACCO NON-USER: CPT | Performed by: NURSE PRACTITIONER

## 2023-08-16 PROCEDURE — G8427 DOCREV CUR MEDS BY ELIG CLIN: HCPCS | Performed by: NURSE PRACTITIONER

## 2023-08-16 PROCEDURE — 3023F SPIROM DOC REV: CPT | Performed by: NURSE PRACTITIONER

## 2023-08-16 PROCEDURE — 99214 OFFICE O/P EST MOD 30 MIN: CPT | Performed by: NURSE PRACTITIONER

## 2023-08-16 RX ORDER — ALBUTEROL SULFATE 90 UG/1
AEROSOL, METERED RESPIRATORY (INHALATION)
Qty: 3 EACH | Refills: 3 | Status: SHIPPED | OUTPATIENT
Start: 2023-08-16

## 2023-08-16 RX ORDER — BUDESONIDE 0.5 MG/2ML
INHALANT ORAL
Qty: 60 EACH | Refills: 11 | Status: SHIPPED | OUTPATIENT
Start: 2023-08-16

## 2023-08-16 RX ORDER — IPRATROPIUM BROMIDE AND ALBUTEROL SULFATE 2.5; .5 MG/3ML; MG/3ML
1 SOLUTION RESPIRATORY (INHALATION) 4 TIMES DAILY
Qty: 1080 ML | Refills: 11 | Status: SHIPPED | OUTPATIENT
Start: 2023-08-16

## 2023-08-16 ASSESSMENT — ENCOUNTER SYMPTOMS
COUGH: 0
SPUTUM PRODUCTION: 0
WHEEZING: 0
HEMOPTYSIS: 0
SHORTNESS OF BREATH: 1

## 2023-08-16 NOTE — PATIENT INSTRUCTIONS
Continue albuterol/ipratropium in (duoneb) in nebulizer 2 - 4 times daily. Continue budesonide in nebulizer twice daily, rinse mouth after use. May use albuterol inhaler 2 puffs twice daily in addition to nebulizer if needed, OR up to 4 times daily when away from home/nebulizer. Continue oxygen with exertion and sleep as prescribed. Flu vaccine and newest COVID booster in the fall.   She is up-to-date on pneumonia vaccines

## 2023-08-16 NOTE — PROGRESS NOTES
[address] [employer]o [employer]. [address] [facility], [address] [place] [address]  [phone]    Patient Name:  Linda Petersen    YOB: 1931    Office Visit 2023      CHIEF COMPLAINT:      Chief Complaint   Patient presents with    COPD         ASSESSMENT:   (Medical Decision Making)                                                                                                                                          Encounter Diagnoses   Name Primary? COPD, mild ([address]) Yes    Hypoxia     Pulmonary hypertension ([address])     Dilated cardiomyopathy (HCC)     Grade II diastolic dysfunction     S/P TAVR (transcatheter aortic valve replacement)     Moderate mitral regurgitation      She is stable symptomatically. She is using nebulizer medicine twice daily and albuterol inhaler intermittently. Discussed that she can use DuoNeb via nebulizer up to 4 times daily if needed for days where she feels more short of breath. States that she is using oxygen with exertion intermittently during the day and with sleep. Encouraged her to use oxygen with exertion as prescribed. Appears compensated from cardiac standpoint at this time. PLAN:     Continue albuterol/ipratropium in (duoneb) in nebulizer 2 - 4 times daily. Continue budesonide in nebulizer twice daily, rinse mouth after use. May use albuterol inhaler 2 puffs twice daily in addition to nebulizer if needed, OR up to 4 times daily when away from home/nebulizer. Continue oxygen with exertion and sleep as prescribed. Flu vaccine and newest COVID booster in the fall. She is up-to-date on pneumonia vaccines    Follow-up and Dispositions    Return in about 6 months (around 2024) for [facility], 40  min appt, COPD, hypoxia, PFT's.            Orders Placed This Encounter   Medications    albuterol sulfate HFA (PROVENTIL;VENTOLIN;PROAIR) 108 (90 Base) MCG/ACT inhaler     Si puffs 4 times daily prn shortness of

## 2024-01-07 ENCOUNTER — APPOINTMENT (OUTPATIENT)
Dept: GENERAL RADIOLOGY | Age: 89
End: 2024-01-07
Payer: MEDICARE

## 2024-01-07 ENCOUNTER — HOSPITAL ENCOUNTER (EMERGENCY)
Age: 89
Discharge: HOME | End: 2024-01-07
Payer: MEDICARE

## 2024-01-07 VITALS
HEART RATE: 74 BPM | DIASTOLIC BLOOD PRESSURE: 87 MMHG | HEIGHT: 62 IN | BODY MASS INDEX: 29.44 KG/M2 | OXYGEN SATURATION: 100 % | SYSTOLIC BLOOD PRESSURE: 169 MMHG | RESPIRATION RATE: 20 BRPM | WEIGHT: 160 LBS | TEMPERATURE: 98.2 F

## 2024-01-07 DIAGNOSIS — N39.0 URINARY TRACT INFECTION WITHOUT HEMATURIA, SITE UNSPECIFIED: ICD-10-CM

## 2024-01-07 DIAGNOSIS — M10.00 ACUTE IDIOPATHIC GOUT, UNSPECIFIED SITE: Primary | ICD-10-CM

## 2024-01-07 LAB
ALBUMIN SERPL-MCNC: 3.7 G/DL (ref 3.2–4.6)
ALBUMIN/GLOB SERPL: 0.9 (ref 0.4–1.6)
ALP SERPL-CCNC: 61 U/L (ref 50–130)
ALT SERPL-CCNC: 9 U/L (ref 12–65)
ANION GAP SERPL CALC-SCNC: 1 MMOL/L (ref 2–11)
APPEARANCE UR: CLEAR
AST SERPL-CCNC: 13 U/L (ref 15–37)
BACTERIA URNS QL MICRO: ABNORMAL /HPF
BASOPHILS # BLD: 0 K/UL (ref 0–0.2)
BASOPHILS NFR BLD: 0 % (ref 0–2)
BILIRUB SERPL-MCNC: 0.7 MG/DL (ref 0.2–1.1)
BILIRUB UR QL: NEGATIVE
BUN SERPL-MCNC: 18 MG/DL (ref 8–23)
CALCIUM SERPL-MCNC: 10.2 MG/DL (ref 8.3–10.4)
CASTS URNS QL MICRO: ABNORMAL /LPF
CHLORIDE SERPL-SCNC: 106 MMOL/L (ref 103–113)
CO2 SERPL-SCNC: 32 MMOL/L (ref 21–32)
COLOR UR: ABNORMAL
CREAT SERPL-MCNC: 1.49 MG/DL (ref 0.6–1)
CRP SERPL-MCNC: 6.5 MG/DL (ref 0–0.9)
DIFFERENTIAL METHOD BLD: ABNORMAL
EOSINOPHIL # BLD: 0.1 K/UL (ref 0–0.8)
EOSINOPHIL NFR BLD: 1 % (ref 0.5–7.8)
EPI CELLS #/AREA URNS HPF: ABNORMAL /HPF
ERYTHROCYTE [DISTWIDTH] IN BLOOD BY AUTOMATED COUNT: 13.9 % (ref 11.9–14.6)
ERYTHROCYTE [SEDIMENTATION RATE] IN BLOOD: 48 MM/HR (ref 0–30)
GLOBULIN SER CALC-MCNC: 4.1 G/DL (ref 2.8–4.5)
GLUCOSE SERPL-MCNC: 115 MG/DL (ref 65–100)
GLUCOSE UR STRIP.AUTO-MCNC: NEGATIVE MG/DL
HCT VFR BLD AUTO: 34.3 % (ref 35.8–46.3)
HGB BLD-MCNC: 10.6 G/DL (ref 11.7–15.4)
HGB UR QL STRIP: NEGATIVE
IMM GRANULOCYTES # BLD AUTO: 0 K/UL (ref 0–0.5)
IMM GRANULOCYTES NFR BLD AUTO: 0 % (ref 0–5)
KETONES UR QL STRIP.AUTO: NEGATIVE MG/DL
LEUKOCYTE ESTERASE UR QL STRIP.AUTO: ABNORMAL
LYMPHOCYTES # BLD: 1.2 K/UL (ref 0.5–4.6)
LYMPHOCYTES NFR BLD: 15 % (ref 13–44)
MCH RBC QN AUTO: 25.7 PG (ref 26.1–32.9)
MCHC RBC AUTO-ENTMCNC: 30.9 G/DL (ref 31.4–35)
MCV RBC AUTO: 83.3 FL (ref 82–102)
MONOCYTES # BLD: 0.8 K/UL (ref 0.1–1.3)
MONOCYTES NFR BLD: 10 % (ref 4–12)
NEUTS SEG # BLD: 5.9 K/UL (ref 1.7–8.2)
NEUTS SEG NFR BLD: 73 % (ref 43–78)
NITRITE UR QL STRIP.AUTO: NEGATIVE
NRBC # BLD: 0 K/UL (ref 0–0.2)
PH UR STRIP: 6 (ref 5–9)
PLATELET # BLD AUTO: 198 K/UL (ref 150–450)
PMV BLD AUTO: 9.9 FL (ref 9.4–12.3)
POTASSIUM SERPL-SCNC: 3.5 MMOL/L (ref 3.5–5.1)
PROT SERPL-MCNC: 7.8 G/DL (ref 6.3–8.2)
PROT UR STRIP-MCNC: NEGATIVE MG/DL
RBC # BLD AUTO: 4.12 M/UL (ref 4.05–5.2)
RBC #/AREA URNS HPF: ABNORMAL /HPF
SODIUM SERPL-SCNC: 139 MMOL/L (ref 136–146)
SP GR UR REFRACTOMETRY: 1.01 (ref 1–1.02)
URATE SERPL-MCNC: 8.4 MG/DL (ref 2.6–6)
UROBILINOGEN UR QL STRIP.AUTO: 1 EU/DL (ref 0.2–1)
WBC # BLD AUTO: 8 K/UL (ref 4.3–11.1)
WBC URNS QL MICRO: ABNORMAL /HPF

## 2024-01-07 PROCEDURE — 80053 COMPREHEN METABOLIC PANEL: CPT

## 2024-01-07 PROCEDURE — 84550 ASSAY OF BLOOD/URIC ACID: CPT

## 2024-01-07 PROCEDURE — 85652 RBC SED RATE AUTOMATED: CPT

## 2024-01-07 PROCEDURE — 6370000000 HC RX 637 (ALT 250 FOR IP): Performed by: PHYSICIAN ASSISTANT

## 2024-01-07 PROCEDURE — 6360000002 HC RX W HCPCS: Performed by: PHYSICIAN ASSISTANT

## 2024-01-07 PROCEDURE — 86140 C-REACTIVE PROTEIN: CPT

## 2024-01-07 PROCEDURE — 85025 COMPLETE CBC W/AUTO DIFF WBC: CPT

## 2024-01-07 PROCEDURE — 96374 THER/PROPH/DIAG INJ IV PUSH: CPT

## 2024-01-07 PROCEDURE — 96375 TX/PRO/DX INJ NEW DRUG ADDON: CPT

## 2024-01-07 PROCEDURE — 99284 EMERGENCY DEPT VISIT MOD MDM: CPT

## 2024-01-07 PROCEDURE — 87086 URINE CULTURE/COLONY COUNT: CPT

## 2024-01-07 PROCEDURE — 73610 X-RAY EXAM OF ANKLE: CPT

## 2024-01-07 PROCEDURE — 81001 URINALYSIS AUTO W/SCOPE: CPT

## 2024-01-07 RX ORDER — CEFDINIR 300 MG/1
300 CAPSULE ORAL 2 TIMES DAILY
Qty: 20 CAPSULE | Refills: 0 | Status: SHIPPED | OUTPATIENT
Start: 2024-01-07 | End: 2024-01-17

## 2024-01-07 RX ORDER — CEFDINIR 300 MG/1
300 CAPSULE ORAL
Status: COMPLETED | OUTPATIENT
Start: 2024-01-07 | End: 2024-01-07

## 2024-01-07 RX ORDER — COLCHICINE 0.6 MG/1
0.6 TABLET ORAL
Status: COMPLETED | OUTPATIENT
Start: 2024-01-07 | End: 2024-01-07

## 2024-01-07 RX ORDER — DEXAMETHASONE SODIUM PHOSPHATE 10 MG/ML
6 INJECTION INTRAMUSCULAR; INTRAVENOUS
Status: COMPLETED | OUTPATIENT
Start: 2024-01-07 | End: 2024-01-07

## 2024-01-07 RX ORDER — PREDNISONE 10 MG/1
TABLET ORAL
Qty: 45 TABLET | Refills: 0 | Status: SHIPPED | OUTPATIENT
Start: 2024-01-07 | End: 2024-01-21

## 2024-01-07 RX ORDER — KETOROLAC TROMETHAMINE 15 MG/ML
15 INJECTION, SOLUTION INTRAMUSCULAR; INTRAVENOUS ONCE
Status: COMPLETED | OUTPATIENT
Start: 2024-01-07 | End: 2024-01-07

## 2024-01-07 RX ADMIN — KETOROLAC TROMETHAMINE 15 MG: 15 INJECTION, SOLUTION INTRAMUSCULAR; INTRAVENOUS at 11:29

## 2024-01-07 RX ADMIN — DEXAMETHASONE SODIUM PHOSPHATE 6 MG: 10 INJECTION INTRAMUSCULAR; INTRAVENOUS at 11:35

## 2024-01-07 RX ADMIN — COLCHICINE 0.6 MG: 0.6 TABLET, FILM COATED ORAL at 12:33

## 2024-01-07 RX ADMIN — CEFDINIR 300 MG: 300 CAPSULE ORAL at 12:33

## 2024-01-07 ASSESSMENT — PAIN - FUNCTIONAL ASSESSMENT: PAIN_FUNCTIONAL_ASSESSMENT: 0-10

## 2024-01-07 ASSESSMENT — LIFESTYLE VARIABLES
HOW MANY STANDARD DRINKS CONTAINING ALCOHOL DO YOU HAVE ON A TYPICAL DAY: PATIENT DOES NOT DRINK
HOW OFTEN DO YOU HAVE A DRINK CONTAINING ALCOHOL: NEVER

## 2024-01-07 ASSESSMENT — PAIN DESCRIPTION - ORIENTATION
ORIENTATION: LEFT

## 2024-01-07 ASSESSMENT — PAIN SCALES - GENERAL
PAINLEVEL_OUTOF10: 8
PAINLEVEL_OUTOF10: 10
PAINLEVEL_OUTOF10: 8
PAINLEVEL_OUTOF10: 8

## 2024-01-07 ASSESSMENT — PAIN DESCRIPTION - LOCATION
LOCATION: FOOT

## 2024-01-07 NOTE — ED PROVIDER NOTES
performed during the hospital encounter of 01/07/24   XR ANKLE LEFT (MIN 3 VIEWS)    Narrative    HISTORY: Left ankle swelling and pain, injury.    FINDINGS:  Diffuse soft tissue swelling. The ankle mortise is preserved.    Os trigonum. Small bony spurrings at the plantar aspect of the calcaneus.    No obvious acute bony fracture or joint dislocations.    Vascular calcifications.      Impression    No obvious acute bony fracture or joint dislocations.    Diffuse soft tissue swelling.   CBC with Auto Differential   Result Value Ref Range    WBC 8.0 4.3 - 11.1 K/uL    RBC 4.12 4.05 - 5.2 M/uL    Hemoglobin 10.6 (L) 11.7 - 15.4 g/dL    Hematocrit 34.3 (L) 35.8 - 46.3 %    MCV 83.3 82.0 - 102.0 FL    MCH 25.7 (L) 26.1 - 32.9 PG    MCHC 30.9 (L) 31.4 - 35.0 g/dL    RDW 13.9 11.9 - 14.6 %    Platelets 198 150 - 450 K/uL    MPV 9.9 9.4 - 12.3 FL    nRBC 0.00 0.0 - 0.2 K/uL    Differential Type AUTOMATED      Neutrophils % 73 43 - 78 %    Lymphocytes % 15 13 - 44 %    Monocytes % 10 4.0 - 12.0 %    Eosinophils % 1 0.5 - 7.8 %    Basophils % 0 0.0 - 2.0 %    Immature Granulocytes 0 0.0 - 5.0 %    Neutrophils Absolute 5.9 1.7 - 8.2 K/UL    Lymphocytes Absolute 1.2 0.5 - 4.6 K/UL    Monocytes Absolute 0.8 0.1 - 1.3 K/UL    Eosinophils Absolute 0.1 0.0 - 0.8 K/UL    Basophils Absolute 0.0 0.0 - 0.2 K/UL    Absolute Immature Granulocyte 0.0 0.0 - 0.5 K/UL   CMP   Result Value Ref Range    Sodium 139 136 - 146 mmol/L    Potassium 3.5 3.5 - 5.1 mmol/L    Chloride 106 103 - 113 mmol/L    CO2 32 21 - 32 mmol/L    Anion Gap 1 (L) 2 - 11 mmol/L    Glucose 115 (H) 65 - 100 mg/dL    BUN 18 8 - 23 MG/DL    Creatinine 1.49 (H) 0.6 - 1.0 MG/DL    Est, Glom Filt Rate 33 (L) >60 ml/min/1.73m2    Calcium 10.2 8.3 - 10.4 MG/DL    Total Bilirubin 0.7 0.2 - 1.1 MG/DL    ALT 9 (L) 12 - 65 U/L    AST 13 (L) 15 - 37 U/L    Alk Phosphatase 61 50 - 130 U/L    Total Protein 7.8 6.3 - 8.2 g/dL    Albumin 3.7 3.2 - 4.6 g/dL    Globulin 4.1 2.8 - 4.5

## 2024-01-07 NOTE — DISCHARGE INSTRUCTIONS
The urine does show infection today. Please take the antibiotics as directed and finish all of them. The urine was sent for culture.  This could take 4 to 5 days to result and if it shows that we need to change your antibiotics, we will call you and let you know.  If you are worsening in any way prior to then, please return to the ED for further evaluation immediately.  Drink plenty of fluids and rest. Please follow up with PCP for recheck.      The uric acid was elevated today indicating gout.  You more than likely have a sprained ankle with a flare of your gout.  Your renal function was slightly diminished today however it was the last time your blood was checked December 02, 2021.  Please make sure you are drinking plenty of water and follow-up with your primary care physician regarding this.  You can use over-the-counter Tylenol as directed if needed for pain.  Return immediately to the ED if worsening in any way.  Use the Ace wrap to help support your ankle if needed and the cane or walker to help keep your weight off of it.  Follow a low purine diet.

## 2024-01-07 NOTE — ED TRIAGE NOTES
Patient reports left ankle swelling and pain. Patient unsure if she slipped and twisted ankle exiting bed.

## 2024-01-07 NOTE — ED NOTES
I have reviewed discharge instructions with the patient.  The patient verbalized understanding.    Patient left ED via Discharge Method: wheelchair to Home with daughter and granddaughter.    Opportunity for questions and clarification provided.       Patient given 2 scripts.         To continue your aftercare when you leave the hospital, you may receive an automated call from our care team to check in on how you are doing.  This is a free service and part of our promise to provide the best care and service to meet your aftercare needs.” If you have questions, or wish to unsubscribe from this service please call [phone].  Thank you for Choosing our [facility] Emergency Department.

## 2024-01-09 LAB
BACTERIA SPEC CULT: NORMAL
BACTERIA SPEC CULT: NORMAL
SERVICE CMNT-IMP: NORMAL

## 2024-01-30 ENCOUNTER — APPOINTMENT (OUTPATIENT)
Dept: GENERAL RADIOLOGY | Age: 89
DRG: 392 | End: 2024-01-30
Payer: MEDICARE

## 2024-01-30 ENCOUNTER — HOSPITAL ENCOUNTER (INPATIENT)
Age: 89
LOS: 1 days | Discharge: HOME | DRG: 392 | End: 2024-02-02
Attending: EMERGENCY MEDICINE | Admitting: INTERNAL MEDICINE
Payer: MEDICARE

## 2024-01-30 DIAGNOSIS — R07.9 CHEST PAIN, UNSPECIFIED TYPE: Primary | ICD-10-CM

## 2024-01-30 DIAGNOSIS — R07.89 OTHER CHEST PAIN: ICD-10-CM

## 2024-01-30 DIAGNOSIS — R74.8 CARDIAC ENZYMES ELEVATED: ICD-10-CM

## 2024-01-30 PROBLEM — N17.9 AKI (ACUTE KIDNEY INJURY) (HCC): Status: ACTIVE | Noted: 2024-01-30

## 2024-01-30 LAB
ALBUMIN SERPL-MCNC: 3.2 G/DL (ref 3.2–4.6)
ALBUMIN/GLOB SERPL: 0.9 (ref 0.4–1.6)
ALP SERPL-CCNC: 58 U/L (ref 50–130)
ALT SERPL-CCNC: 14 U/L (ref 12–65)
ANION GAP SERPL CALC-SCNC: 2 MMOL/L (ref 2–11)
AST SERPL-CCNC: 12 U/L (ref 15–37)
BASOPHILS # BLD: 0 K/UL (ref 0–0.2)
BASOPHILS NFR BLD: 0 % (ref 0–2)
BILIRUB SERPL-MCNC: 0.3 MG/DL (ref 0.2–1.1)
BUN SERPL-MCNC: 25 MG/DL (ref 8–23)
CALCIUM SERPL-MCNC: 9.4 MG/DL (ref 8.3–10.4)
CHLORIDE SERPL-SCNC: 106 MMOL/L (ref 103–113)
CO2 SERPL-SCNC: 32 MMOL/L (ref 21–32)
CREAT SERPL-MCNC: 1.37 MG/DL (ref 0.6–1)
DIFFERENTIAL METHOD BLD: ABNORMAL
EKG ATRIAL RATE: 75 BPM
EKG DIAGNOSIS: ABNORMAL
EKG P AXIS: 11 DEGREES
EKG P-R INTERVAL: 188 MS
EKG Q-T INTERVAL: 449 MS
EKG QRS DURATION: 156 MS
EKG QTC CALCULATION (BAZETT): 495 MS
EKG R AXIS: -30 DEGREES
EKG T AXIS: 17 DEGREES
EKG VENTRICULAR RATE: 73 BPM
EOSINOPHIL # BLD: 0.1 K/UL (ref 0–0.8)
EOSINOPHIL NFR BLD: 2 % (ref 0.5–7.8)
ERYTHROCYTE [DISTWIDTH] IN BLOOD BY AUTOMATED COUNT: 16.2 % (ref 11.9–14.6)
GLOBULIN SER CALC-MCNC: 3.7 G/DL (ref 2.8–4.5)
GLUCOSE SERPL-MCNC: 114 MG/DL (ref 65–100)
HCT VFR BLD AUTO: 32.3 % (ref 35.8–46.3)
HGB BLD-MCNC: 9.7 G/DL (ref 11.7–15.4)
IMM GRANULOCYTES # BLD AUTO: 0 K/UL (ref 0–0.5)
IMM GRANULOCYTES NFR BLD AUTO: 1 % (ref 0–5)
LYMPHOCYTES # BLD: 1.1 K/UL (ref 0.5–4.6)
LYMPHOCYTES NFR BLD: 21 % (ref 13–44)
MCH RBC QN AUTO: 25.4 PG (ref 26.1–32.9)
MCHC RBC AUTO-ENTMCNC: 30 G/DL (ref 31.4–35)
MCV RBC AUTO: 84.6 FL (ref 82–102)
MONOCYTES # BLD: 0.5 K/UL (ref 0.1–1.3)
MONOCYTES NFR BLD: 9 % (ref 4–12)
NEUTS SEG # BLD: 3.5 K/UL (ref 1.7–8.2)
NEUTS SEG NFR BLD: 66 % (ref 43–78)
NRBC # BLD: 0 K/UL (ref 0–0.2)
NT PRO BNP: 1151 PG/ML
PLATELET # BLD AUTO: 191 K/UL (ref 150–450)
PMV BLD AUTO: 9.6 FL (ref 9.4–12.3)
POTASSIUM SERPL-SCNC: 3.8 MMOL/L (ref 3.5–5.1)
PROT SERPL-MCNC: 6.9 G/DL (ref 6.3–8.2)
RBC # BLD AUTO: 3.82 M/UL (ref 4.05–5.2)
SODIUM SERPL-SCNC: 140 MMOL/L (ref 136–146)
TROPONIN I SERPL HS-MCNC: 47.1 PG/ML (ref 0–14)
TROPONIN I SERPL HS-MCNC: 49.9 PG/ML (ref 0–14)
WBC # BLD AUTO: 5.3 K/UL (ref 4.3–11.1)

## 2024-01-30 PROCEDURE — 80053 COMPREHEN METABOLIC PANEL: CPT

## 2024-01-30 PROCEDURE — G0378 HOSPITAL OBSERVATION PER HR: HCPCS

## 2024-01-30 PROCEDURE — 71046 X-RAY EXAM CHEST 2 VIEWS: CPT

## 2024-01-30 PROCEDURE — 99285 EMERGENCY DEPT VISIT HI MDM: CPT

## 2024-01-30 PROCEDURE — 85025 COMPLETE CBC W/AUTO DIFF WBC: CPT

## 2024-01-30 PROCEDURE — 93005 ELECTROCARDIOGRAM TRACING: CPT | Performed by: EMERGENCY MEDICINE

## 2024-01-30 PROCEDURE — 6370000000 HC RX 637 (ALT 250 FOR IP): Performed by: EMERGENCY MEDICINE

## 2024-01-30 PROCEDURE — 84484 ASSAY OF TROPONIN QUANT: CPT

## 2024-01-30 PROCEDURE — 83880 ASSAY OF NATRIURETIC PEPTIDE: CPT

## 2024-01-30 PROCEDURE — 93010 ELECTROCARDIOGRAM REPORT: CPT | Performed by: INTERNAL MEDICINE

## 2024-01-30 RX ORDER — MAGNESIUM SULFATE IN WATER 40 MG/ML
2000 INJECTION, SOLUTION INTRAVENOUS PRN
Status: DISCONTINUED | OUTPATIENT
Start: 2024-01-30 | End: 2024-01-30

## 2024-01-30 RX ORDER — BUDESONIDE 0.5 MG/2ML
1 INHALANT ORAL
Status: DISCONTINUED | OUTPATIENT
Start: 2024-01-30 | End: 2024-02-02

## 2024-01-30 RX ORDER — FAMOTIDINE 20 MG/1
20 TABLET, FILM COATED ORAL NIGHTLY
Status: DISCONTINUED | OUTPATIENT
Start: 2024-01-30 | End: 2024-02-02 | Stop reason: HOSPADM

## 2024-01-30 RX ORDER — HYDRALAZINE HYDROCHLORIDE 50 MG/1
100 TABLET, FILM COATED ORAL 3 TIMES DAILY
Status: DISCONTINUED | OUTPATIENT
Start: 2024-01-30 | End: 2024-01-31 | Stop reason: SDUPTHER

## 2024-01-30 RX ORDER — SODIUM CHLORIDE 0.9 % (FLUSH) 0.9 %
5-40 SYRINGE (ML) INJECTION PRN
Status: DISCONTINUED | OUTPATIENT
Start: 2024-01-30 | End: 2024-02-02 | Stop reason: HOSPADM

## 2024-01-30 RX ORDER — ONDANSETRON 4 MG/1
4 TABLET, ORALLY DISINTEGRATING ORAL EVERY 8 HOURS PRN
Status: DISCONTINUED | OUTPATIENT
Start: 2024-01-30 | End: 2024-02-02 | Stop reason: HOSPADM

## 2024-01-30 RX ORDER — ACETAMINOPHEN 650 MG/1
650 SUPPOSITORY RECTAL EVERY 6 HOURS PRN
Status: DISCONTINUED | OUTPATIENT
Start: 2024-01-30 | End: 2024-02-02 | Stop reason: HOSPADM

## 2024-01-30 RX ORDER — ACETAMINOPHEN 325 MG/1
650 TABLET ORAL EVERY 6 HOURS PRN
Status: DISCONTINUED | OUTPATIENT
Start: 2024-01-30 | End: 2024-02-02 | Stop reason: HOSPADM

## 2024-01-30 RX ORDER — POTASSIUM CHLORIDE 20 MEQ/1
40 TABLET, EXTENDED RELEASE ORAL PRN
Status: DISCONTINUED | OUTPATIENT
Start: 2024-01-30 | End: 2024-01-30

## 2024-01-30 RX ORDER — ONDANSETRON 2 MG/ML
4 INJECTION INTRAMUSCULAR; INTRAVENOUS EVERY 6 HOURS PRN
Status: DISCONTINUED | OUTPATIENT
Start: 2024-01-30 | End: 2024-02-02 | Stop reason: HOSPADM

## 2024-01-30 RX ORDER — OXYBUTYNIN CHLORIDE 5 MG/1
5 TABLET, EXTENDED RELEASE ORAL NIGHTLY
Status: DISCONTINUED | OUTPATIENT
Start: 2024-01-31 | End: 2024-02-02 | Stop reason: HOSPADM

## 2024-01-30 RX ORDER — NITROGLYCERIN 0.4 MG/1
0.4 TABLET SUBLINGUAL
Status: COMPLETED | OUTPATIENT
Start: 2024-01-30 | End: 2024-01-30

## 2024-01-30 RX ORDER — FUROSEMIDE 20 MG/1
20 TABLET ORAL DAILY
Status: DISCONTINUED | OUTPATIENT
Start: 2024-01-31 | End: 2024-01-31

## 2024-01-30 RX ORDER — METOPROLOL SUCCINATE 50 MG/1
50 TABLET, EXTENDED RELEASE ORAL DAILY
Status: DISCONTINUED | OUTPATIENT
Start: 2024-01-31 | End: 2024-01-31

## 2024-01-30 RX ORDER — FOLIC ACID 1 MG/1
1 TABLET ORAL DAILY
Status: DISCONTINUED | OUTPATIENT
Start: 2024-01-31 | End: 2024-02-02 | Stop reason: HOSPADM

## 2024-01-30 RX ORDER — SODIUM CHLORIDE 9 MG/ML
INJECTION, SOLUTION INTRAVENOUS PRN
Status: DISCONTINUED | OUTPATIENT
Start: 2024-01-30 | End: 2024-02-02 | Stop reason: HOSPADM

## 2024-01-30 RX ORDER — LOSARTAN POTASSIUM 50 MG/1
100 TABLET ORAL DAILY
Status: DISCONTINUED | OUTPATIENT
Start: 2024-01-31 | End: 2024-02-02 | Stop reason: HOSPADM

## 2024-01-30 RX ORDER — POTASSIUM CHLORIDE 7.45 MG/ML
10 INJECTION INTRAVENOUS PRN
Status: DISCONTINUED | OUTPATIENT
Start: 2024-01-30 | End: 2024-01-30

## 2024-01-30 RX ORDER — IPRATROPIUM BROMIDE AND ALBUTEROL SULFATE 2.5; .5 MG/3ML; MG/3ML
1 SOLUTION RESPIRATORY (INHALATION)
Status: DISCONTINUED | OUTPATIENT
Start: 2024-01-30 | End: 2024-01-31

## 2024-01-30 RX ORDER — POLYETHYLENE GLYCOL 3350 17 G/17G
17 POWDER, FOR SOLUTION ORAL DAILY PRN
Status: DISCONTINUED | OUTPATIENT
Start: 2024-01-30 | End: 2024-02-02 | Stop reason: HOSPADM

## 2024-01-30 RX ORDER — FERROUS SULFATE 325(65) MG
325 TABLET ORAL DAILY
Status: DISCONTINUED | OUTPATIENT
Start: 2024-01-31 | End: 2024-02-02 | Stop reason: HOSPADM

## 2024-01-30 RX ORDER — SODIUM CHLORIDE 0.9 % (FLUSH) 0.9 %
5-40 SYRINGE (ML) INJECTION EVERY 12 HOURS SCHEDULED
Status: DISCONTINUED | OUTPATIENT
Start: 2024-01-30 | End: 2024-02-02 | Stop reason: HOSPADM

## 2024-01-30 RX ORDER — ASPIRIN 81 MG/1
81 TABLET ORAL DAILY
Status: DISCONTINUED | OUTPATIENT
Start: 2024-01-31 | End: 2024-02-02 | Stop reason: HOSPADM

## 2024-01-30 RX ADMIN — NITROGLYCERIN 0.4 MG: 0.4 TABLET, ORALLY DISINTEGRATING SUBLINGUAL at 19:08

## 2024-01-30 ASSESSMENT — PAIN SCALES - GENERAL: PAINLEVEL_OUTOF10: 6

## 2024-01-30 ASSESSMENT — ENCOUNTER SYMPTOMS
ABDOMINAL PAIN: 0
VOMITING: 0
SHORTNESS OF BREATH: 1
RHINORRHEA: 0
EYE DISCHARGE: 0
NAUSEA: 0
EYE REDNESS: 0
COLOR CHANGE: 0
FACIAL SWELLING: 0
BACK PAIN: 0
COUGH: 0

## 2024-01-30 ASSESSMENT — PAIN DESCRIPTION - LOCATION: LOCATION: CHEST

## 2024-01-30 ASSESSMENT — LIFESTYLE VARIABLES
HOW OFTEN DO YOU HAVE A DRINK CONTAINING ALCOHOL: NEVER
HOW MANY STANDARD DRINKS CONTAINING ALCOHOL DO YOU HAVE ON A TYPICAL DAY: PATIENT DOES NOT DRINK

## 2024-01-30 NOTE — ED TRIAGE NOTES
Patient co chest pain, sob since this am pt reports she uses 2/3 L NC at home at night but had to use it today during the day.

## 2024-01-30 NOTE — ED PROVIDER NOTES
Emergency Department Provider Note       PCP: Jair Robles APRN - NP   Age: 89 y.o.   Sex: female     DISPOSITION       No diagnosis found.    Medical Decision Making     Complexity of Problems Addressed:  1 or more acute illnesses that pose a threat to life or bodily function.     Data Reviewed and Analyzed:  I independently ordered and reviewed each unique test.  I reviewed external records: ED visit note from an outside group.  I reviewed external records: provider visit note from PCP.  I reviewed external records: previous EKG including cardiologist interpretation.    I reviewed external records: previous lab results from outside ED.  I reviewed external records: previous imaging study including radiologist interpretation.   The patients assessment required an independent historian: Family at bedside.  The reason they were needed is important historical information not provided by the patient.    I independently interpreted the cardiac monitor rhythm strip paced rhythm no ectopy.  I interpreted the X-rays x-rays negative for acute infiltrates or effusions.  I interpreted the EKG in the absence of a cardiologist, she has atrial sensed ventricular paced pacing heart rate of 73 with a wide QRS, nonspecific ST-T wave changes.    Discussion of management or test interpretation.  Chest pain, somewhat atypical, EKG is paced and unreadable, first troponin elevated at 47.  Patient actually experienced some relief of her central chest sharp discomfort with 1 nitroglycerin.  Awaiting second troponin    Second troponin still elevated, minimally higher than the first.  Still doubtful for ACS but in light of chest pain that relieved with the nitroglycerin, abnormal troponins, and elevating troponins we will go ahead and admit to the hospital service for observation    The patient was admitted and I have discussed patient management with the admitting provider.    Risk of Complications and/or Morbidity of Patient  Comprehensive Metabolic Panel    Troponin    Cardiac Monitor - ED Only    Pulse oximetry, continuous    EKG 12 Lead    Saline lock IV        Medications given during this emergency department visit:  Medications - No data to display    New Prescriptions    No medications on file        Past Medical History:   Diagnosis Date    Anemia     CAD (coronary artery disease)     pacemaker    Cervical dystonia     Chronic kidney disease, stage III (moderate) (HCC)     Diaphragmatic hernia without mention of obstruction or gangrene     Dysphagia, late effect of cerebrovascular disease     Dysphasia, late effect of cerebrovascular disease     GERD (gastroesophageal reflux disease)     Gout     Hyperlipidemia     Pacemaker     Sick sinus syndrome (HCC) 9/10/2015    Spastic dysphonia         Past Surgical History:   Procedure Laterality Date    COLONOSCOPY      dr mahmood    HYSTERECTOMY (CERVIX STATUS UNKNOWN)      ORTHOPEDIC SURGERY      Left knee replacement    ORTHOPEDIC SURGERY      Right foot surgery    ORTHOPEDIC SURGERY      Right knee replacement    ORTHOPEDIC SURGERY      Left Total Knee    PACEMAKER  ,     [place] UNLISTED PROCEDURE CARDIAC SURGERY          Social History     Socioeconomic History    Marital status:    Tobacco Use    Smoking status: Former     Current packs/day: 0.00     Types: Cigarettes     Quit date: 1945     Years since quittin.1    Smokeless tobacco: Never    Tobacco comments:     Quit smoking: she attempt to smoke x 1 yr, did not smoke 1 total of 1 pk   Vaping Use    Vaping Use: Never used   Substance and Sexual Activity    Alcohol use: No     Alcohol/week: 0.0 standard drinks of alcohol    Drug use: No   Social History Narrative    She denies ETOH use.  She has a 15 pack year history of tobacco smoking but quit in .  There is no history of toxic industrial or TB exposure.  She is  and resides alone.        Previous Medications    ALBUTEROL

## 2024-01-31 ENCOUNTER — APPOINTMENT (OUTPATIENT)
Dept: NON INVASIVE DIAGNOSTICS | Age: 89
DRG: 392 | End: 2024-01-31
Attending: STUDENT IN AN ORGANIZED HEALTH CARE EDUCATION/TRAINING PROGRAM
Payer: MEDICARE

## 2024-01-31 PROBLEM — R79.89 ELEVATED TROPONIN: Status: ACTIVE | Noted: 2024-01-31

## 2024-01-31 LAB
ALBUMIN SERPL-MCNC: 3 G/DL (ref 3.2–4.6)
ALBUMIN/GLOB SERPL: 0.9 (ref 0.4–1.6)
ALP SERPL-CCNC: 52 U/L (ref 50–136)
ALT SERPL-CCNC: 11 U/L (ref 12–65)
ANION GAP SERPL CALC-SCNC: 2 MMOL/L (ref 2–11)
AST SERPL-CCNC: 10 U/L (ref 15–37)
BASOPHILS # BLD: 0 K/UL (ref 0–0.2)
BASOPHILS NFR BLD: 0 % (ref 0–2)
BILIRUB SERPL-MCNC: 0.4 MG/DL (ref 0.2–1.1)
BUN SERPL-MCNC: 21 MG/DL (ref 8–23)
CALCIUM SERPL-MCNC: 9 MG/DL (ref 8.3–10.4)
CHLORIDE SERPL-SCNC: 108 MMOL/L (ref 103–113)
CO2 SERPL-SCNC: 32 MMOL/L (ref 21–32)
CREAT SERPL-MCNC: 1.24 MG/DL (ref 0.6–1)
DIFFERENTIAL METHOD BLD: ABNORMAL
ECHO AO ASC DIAM: 3.2 CM
ECHO AO ASCENDING AORTA INDEX: 1.83 CM/M2
ECHO AV AREA PEAK VELOCITY: 0.9 CM2
ECHO AV AREA VTI: 1 CM2
ECHO AV AREA/BSA PEAK VELOCITY: 0.5 CM2/M2
ECHO AV AREA/BSA VTI: 0.6 CM2/M2
ECHO AV MEAN GRADIENT: 22 MMHG
ECHO AV MEAN VELOCITY: 2.2 M/S
ECHO AV PEAK GRADIENT: 39 MMHG
ECHO AV PEAK VELOCITY: 3.1 M/S
ECHO AV VELOCITY RATIO: 0.29
ECHO AV VTI: 68.1 CM
ECHO BSA: 1.79 M2
ECHO EST RA PRESSURE: 3 MMHG
ECHO IVC EXP: 1.7 CM
ECHO LA AREA 2C: 33.9 CM2
ECHO LA AREA 4C: 30.7 CM2
ECHO LA DIAMETER INDEX: 3.14 CM/M2
ECHO LA DIAMETER: 5.5 CM
ECHO LA MAJOR AXIS: 6.6 CM
ECHO LA MINOR AXIS: 6.8 CM
ECHO LA VOL BP: 127 ML (ref 22–52)
ECHO LA VOL MOD A2C: 138 ML (ref 22–52)
ECHO LA VOL MOD A4C: 114 ML (ref 22–52)
ECHO LA VOL/BSA BIPLANE: 73 ML/M2 (ref 16–34)
ECHO LA VOLUME INDEX MOD A2C: 79 ML/M2 (ref 16–34)
ECHO LA VOLUME INDEX MOD A4C: 65 ML/M2 (ref 16–34)
ECHO LV E' LATERAL VELOCITY: 5 CM/S
ECHO LV E' SEPTAL VELOCITY: 3 CM/S
ECHO LV EDV A2C: 126 ML
ECHO LV EDV A4C: 125 ML
ECHO LV EDV INDEX A4C: 71 ML/M2
ECHO LV EDV NDEX A2C: 72 ML/M2
ECHO LV EJECTION FRACTION A2C: 63 %
ECHO LV EJECTION FRACTION A4C: 56 %
ECHO LV EJECTION FRACTION BIPLANE: 61 % (ref 55–100)
ECHO LV ESV A2C: 46 ML
ECHO LV ESV A4C: 56 ML
ECHO LV ESV INDEX A2C: 26 ML/M2
ECHO LV ESV INDEX A4C: 32 ML/M2
ECHO LV FRACTIONAL SHORTENING: 35 % (ref 28–44)
ECHO LV INTERNAL DIMENSION DIASTOLE INDEX: 2.8 CM/M2
ECHO LV INTERNAL DIMENSION DIASTOLIC: 4.9 CM (ref 3.9–5.3)
ECHO LV INTERNAL DIMENSION SYSTOLIC INDEX: 1.83 CM/M2
ECHO LV INTERNAL DIMENSION SYSTOLIC: 3.2 CM
ECHO LV IVSD: 1.4 CM (ref 0.6–0.9)
ECHO LV MASS 2D: 282.6 G (ref 67–162)
ECHO LV MASS INDEX 2D: 161.5 G/M2 (ref 43–95)
ECHO LV POSTERIOR WALL DIASTOLIC: 1.4 CM (ref 0.6–0.9)
ECHO LV RELATIVE WALL THICKNESS RATIO: 0.57
ECHO LVOT AREA: 3.1 CM2
ECHO LVOT AV VTI INDEX: 0.3
ECHO LVOT DIAM: 2 CM
ECHO LVOT MEAN GRADIENT: 2 MMHG
ECHO LVOT PEAK GRADIENT: 3 MMHG
ECHO LVOT PEAK VELOCITY: 0.9 M/S
ECHO LVOT STROKE VOLUME INDEX: 37.1 ML/M2
ECHO LVOT SV: 65 ML
ECHO LVOT VTI: 20.7 CM
ECHO MV A VELOCITY: 0.66 M/S
ECHO MV AREA VTI: 2.1 CM2
ECHO MV E DECELERATION TIME (DT): 290 MS
ECHO MV E VELOCITY: 0.54 M/S
ECHO MV E/A RATIO: 0.82
ECHO MV E/E' LATERAL: 10.8
ECHO MV E/E' RATIO (AVERAGED): 14.4
ECHO MV LVOT VTI INDEX: 1.48
ECHO MV MAX VELOCITY: 1 M/S
ECHO MV MEAN GRADIENT: 1 MMHG
ECHO MV MEAN VELOCITY: 0.5 M/S
ECHO MV PEAK GRADIENT: 4 MMHG
ECHO MV VTI: 30.6 CM
ECHO PV ACCELERATION TIME (AT): 74 MS
ECHO PV MAX VELOCITY: 1 M/S
ECHO PV PEAK GRADIENT: 4 MMHG
ECHO RIGHT VENTRICULAR SYSTOLIC PRESSURE (RVSP): 41 MMHG
ECHO RV BASAL DIMENSION: 3.8 CM
ECHO RV FREE WALL PEAK S': 12 CM/S
ECHO RV TAPSE: 1.5 CM (ref 1.7–?)
ECHO TV REGURGITANT MAX VELOCITY: 3.07 M/S
ECHO TV REGURGITANT PEAK GRADIENT: 38 MMHG
EOSINOPHIL # BLD: 0.1 K/UL (ref 0–0.8)
EOSINOPHIL NFR BLD: 2 % (ref 0.5–7.8)
ERYTHROCYTE [DISTWIDTH] IN BLOOD BY AUTOMATED COUNT: 16 % (ref 11.9–14.6)
GLOBULIN SER CALC-MCNC: 3.2 G/DL (ref 2.8–4.5)
GLUCOSE SERPL-MCNC: 104 MG/DL (ref 65–100)
HCT VFR BLD AUTO: 29.8 % (ref 35.8–46.3)
HGB BLD-MCNC: 9 G/DL (ref 11.7–15.4)
IMM GRANULOCYTES # BLD AUTO: 0 K/UL (ref 0–0.5)
IMM GRANULOCYTES NFR BLD AUTO: 1 % (ref 0–5)
LYMPHOCYTES # BLD: 1.1 K/UL (ref 0.5–4.6)
LYMPHOCYTES NFR BLD: 19 % (ref 13–44)
MCH RBC QN AUTO: 25.4 PG (ref 26.1–32.9)
MCHC RBC AUTO-ENTMCNC: 30.2 G/DL (ref 31.4–35)
MCV RBC AUTO: 83.9 FL (ref 82–102)
MONOCYTES # BLD: 0.6 K/UL (ref 0.1–1.3)
MONOCYTES NFR BLD: 10 % (ref 4–12)
NEUTS SEG # BLD: 3.8 K/UL (ref 1.7–8.2)
NEUTS SEG NFR BLD: 68 % (ref 43–78)
NRBC # BLD: 0 K/UL (ref 0–0.2)
PLATELET # BLD AUTO: 174 K/UL (ref 150–450)
PMV BLD AUTO: 9 FL (ref 9.4–12.3)
POTASSIUM SERPL-SCNC: 4.3 MMOL/L (ref 3.5–5.1)
PROT SERPL-MCNC: 6.2 G/DL (ref 6.3–8.2)
RBC # BLD AUTO: 3.55 M/UL (ref 4.05–5.2)
SODIUM SERPL-SCNC: 142 MMOL/L (ref 136–146)
TROPONIN I SERPL HS-MCNC: 47.2 PG/ML (ref 0–14)
WBC # BLD AUTO: 5.6 K/UL (ref 4.3–11.1)

## 2024-01-31 PROCEDURE — 2580000003 HC RX 258: Performed by: STUDENT IN AN ORGANIZED HEALTH CARE EDUCATION/TRAINING PROGRAM

## 2024-01-31 PROCEDURE — 97165 OT EVAL LOW COMPLEX 30 MIN: CPT

## 2024-01-31 PROCEDURE — 6370000000 HC RX 637 (ALT 250 FOR IP): Performed by: STUDENT IN AN ORGANIZED HEALTH CARE EDUCATION/TRAINING PROGRAM

## 2024-01-31 PROCEDURE — 6370000000 HC RX 637 (ALT 250 FOR IP): Performed by: FAMILY MEDICINE

## 2024-01-31 PROCEDURE — 6360000002 HC RX W HCPCS: Performed by: FAMILY MEDICINE

## 2024-01-31 PROCEDURE — C8929 TTE W OR WO FOL WCON,DOPPLER: HCPCS

## 2024-01-31 PROCEDURE — 85025 COMPLETE CBC W/AUTO DIFF WBC: CPT

## 2024-01-31 PROCEDURE — G0378 HOSPITAL OBSERVATION PER HR: HCPCS

## 2024-01-31 PROCEDURE — 94761 N-INVAS EAR/PLS OXIMETRY MLT: CPT

## 2024-01-31 PROCEDURE — A4216 STERILE WATER/SALINE, 10 ML: HCPCS | Performed by: STUDENT IN AN ORGANIZED HEALTH CARE EDUCATION/TRAINING PROGRAM

## 2024-01-31 PROCEDURE — 97535 SELF CARE MNGMENT TRAINING: CPT

## 2024-01-31 PROCEDURE — 80053 COMPREHEN METABOLIC PANEL: CPT

## 2024-01-31 PROCEDURE — 6360000004 HC RX CONTRAST MEDICATION: Performed by: STUDENT IN AN ORGANIZED HEALTH CARE EDUCATION/TRAINING PROGRAM

## 2024-01-31 PROCEDURE — 99222 1ST HOSP IP/OBS MODERATE 55: CPT | Performed by: INTERNAL MEDICINE

## 2024-01-31 PROCEDURE — 97161 PT EVAL LOW COMPLEX 20 MIN: CPT

## 2024-01-31 PROCEDURE — 84484 ASSAY OF TROPONIN QUANT: CPT

## 2024-01-31 PROCEDURE — 97530 THERAPEUTIC ACTIVITIES: CPT

## 2024-01-31 PROCEDURE — 2700000000 HC OXYGEN THERAPY PER DAY

## 2024-01-31 PROCEDURE — 36415 COLL VENOUS BLD VENIPUNCTURE: CPT

## 2024-01-31 PROCEDURE — 96374 THER/PROPH/DIAG INJ IV PUSH: CPT

## 2024-01-31 PROCEDURE — 94640 AIRWAY INHALATION TREATMENT: CPT

## 2024-01-31 PROCEDURE — 2580000003 HC RX 258: Performed by: FAMILY MEDICINE

## 2024-01-31 RX ORDER — IPRATROPIUM BROMIDE AND ALBUTEROL SULFATE 2.5; .5 MG/3ML; MG/3ML
1 SOLUTION RESPIRATORY (INHALATION) EVERY 4 HOURS PRN
Status: DISCONTINUED | OUTPATIENT
Start: 2024-01-31 | End: 2024-02-02 | Stop reason: HOSPADM

## 2024-01-31 RX ORDER — IPRATROPIUM BROMIDE AND ALBUTEROL SULFATE 2.5; .5 MG/3ML; MG/3ML
1 SOLUTION RESPIRATORY (INHALATION)
Status: DISCONTINUED | OUTPATIENT
Start: 2024-01-31 | End: 2024-02-02 | Stop reason: HOSPADM

## 2024-01-31 RX ORDER — MORPHINE SULFATE 2 MG/ML
1 INJECTION, SOLUTION INTRAMUSCULAR; INTRAVENOUS ONCE
Status: COMPLETED | OUTPATIENT
Start: 2024-01-31 | End: 2024-01-31

## 2024-01-31 RX ORDER — FUROSEMIDE 40 MG/1
40 TABLET ORAL DAILY
Status: DISCONTINUED | OUTPATIENT
Start: 2024-02-01 | End: 2024-02-01

## 2024-01-31 RX ORDER — HYDRALAZINE HYDROCHLORIDE 50 MG/1
100 TABLET, FILM COATED ORAL 3 TIMES DAILY
Status: DISCONTINUED | OUTPATIENT
Start: 2024-01-31 | End: 2024-02-02 | Stop reason: HOSPADM

## 2024-01-31 RX ORDER — HYDRALAZINE HYDROCHLORIDE 50 MG/1
100 TABLET, FILM COATED ORAL 3 TIMES DAILY
Status: DISCONTINUED | OUTPATIENT
Start: 2024-01-31 | End: 2024-01-31

## 2024-01-31 RX ORDER — CARVEDILOL 25 MG/1
25 TABLET ORAL 2 TIMES DAILY WITH MEALS
Status: DISCONTINUED | OUTPATIENT
Start: 2024-01-31 | End: 2024-02-02 | Stop reason: HOSPADM

## 2024-01-31 RX ADMIN — PERFLUTREN 0.45 ML: 6.52 INJECTION, SUSPENSION INTRAVENOUS at 09:55

## 2024-01-31 RX ADMIN — BUDESONIDE 1000 MCG: 0.5 INHALANT RESPIRATORY (INHALATION) at 07:21

## 2024-01-31 RX ADMIN — FUROSEMIDE 20 MG: 20 TABLET ORAL at 08:08

## 2024-01-31 RX ADMIN — MORPHINE SULFATE 1 MG: 2 INJECTION, SOLUTION INTRAMUSCULAR; INTRAVENOUS at 06:04

## 2024-01-31 RX ADMIN — FAMOTIDINE 20 MG: 20 TABLET, FILM COATED ORAL at 20:38

## 2024-01-31 RX ADMIN — OXYBUTYNIN CHLORIDE 5 MG: 5 TABLET, EXTENDED RELEASE ORAL at 20:38

## 2024-01-31 RX ADMIN — IPRATROPIUM BROMIDE AND ALBUTEROL SULFATE 1 DOSE: 2.5; .5 SOLUTION RESPIRATORY (INHALATION) at 13:37

## 2024-01-31 RX ADMIN — FOLIC ACID 1 MG: 1 TABLET ORAL at 08:08

## 2024-01-31 RX ADMIN — FAMOTIDINE 20 MG: 20 TABLET, FILM COATED ORAL at 00:21

## 2024-01-31 RX ADMIN — ASPIRIN 81 MG: 81 TABLET, COATED ORAL at 08:08

## 2024-01-31 RX ADMIN — IPRATROPIUM BROMIDE AND ALBUTEROL SULFATE 1 DOSE: .5; 3 SOLUTION RESPIRATORY (INHALATION) at 07:19

## 2024-01-31 RX ADMIN — APIXABAN 5 MG: 5 TABLET, FILM COATED ORAL at 20:38

## 2024-01-31 RX ADMIN — CARVEDILOL 25 MG: 25 TABLET, FILM COATED ORAL at 08:08

## 2024-01-31 RX ADMIN — BUDESONIDE 1000 MCG: 0.5 INHALANT RESPIRATORY (INHALATION) at 20:56

## 2024-01-31 RX ADMIN — LOSARTAN POTASSIUM 100 MG: 50 TABLET, FILM COATED ORAL at 08:08

## 2024-01-31 RX ADMIN — APIXABAN 5 MG: 5 TABLET, FILM COATED ORAL at 08:08

## 2024-01-31 RX ADMIN — HYDRALAZINE HYDROCHLORIDE 100 MG: 50 TABLET, FILM COATED ORAL at 20:38

## 2024-01-31 RX ADMIN — IPRATROPIUM BROMIDE AND ALBUTEROL SULFATE 1 DOSE: 2.5; .5 SOLUTION RESPIRATORY (INHALATION) at 20:57

## 2024-01-31 RX ADMIN — CARVEDILOL 25 MG: 25 TABLET, FILM COATED ORAL at 18:12

## 2024-01-31 RX ADMIN — SODIUM CHLORIDE, PRESERVATIVE FREE 10 ML: 5 INJECTION INTRAVENOUS at 00:22

## 2024-01-31 RX ADMIN — FERROUS SULFATE TAB 325 MG (65 MG ELEMENTAL FE) 325 MG: 325 (65 FE) TAB at 08:08

## 2024-01-31 ASSESSMENT — PAIN SCALES - GENERAL: PAINLEVEL_OUTOF10: 7

## 2024-01-31 ASSESSMENT — PAIN DESCRIPTION - LOCATION: LOCATION: RIB CAGE

## 2024-01-31 ASSESSMENT — PAIN DESCRIPTION - ORIENTATION: ORIENTATION: RIGHT

## 2024-01-31 NOTE — PROGRESS NOTES
01/31/24 0729   RT Protocol   History Pulmonary Disease 0   Respiratory pattern 2   Breath sounds 2   Cough 0   Indications for Bronchodilator Therapy On home bronchodilators   Bronchodilator Assessment Score 4           Respiratory Care Services Policy Number: [id number]    Title: Aerosolized Medication Protocol    Effective Date: 10/1998    Revised Date: 06/13, 03/16, 11/17, 07/19     Reviewed Date: 05/14/ 03/15 , 06/17, 5/18, 11/2020   I.  Policy: The Aerosolized Medication Protocol shall by implemented by Respiratory Care Practitioners (RCP) for patients with orders to receive aerosol therapy with medication.     II. Purpose:  To open and maintain obstructed airways, the RCP, will utilize the following   protocol to select the indicated aerosolized medication(s) and determine the most effective method of delivery to the patient.    III. Patient Type: All patients who are determined to meet aerosolized medication criteria as          outlined in this protocol.    IV. Responsibility: Director, Respiratory Care Services, registered Respiratory Care Practitioners (RCP's) with documented competency in the performance of                                     respiratory therapeutic techniques.    V. Equipment needed:  Stethoscope  Pulse oximeter  AeroEclipse nebulizer  Meter Dose Inhaler (MDI)    VI. Protocol:   The following conditions are accepted indications for aerosolized medication therapy.   Bronchospasm/wheezing  Impaired mucociliary clearance  Tracheobronchial mucosal congestion/and laryngeal stridor  Diseases which commonly require aerosolized medication therapy include, but are not limited to:  Asthma/reactive airway disease  Bronchitis/emphysema (COPD)  Cystic fibrosis  Severe laryngitis/tracheitis  Bronchiectasis  Smoke inhalation or chemical trauma to the lung or upper airway  Physical trauma to the upper airway  Laryngotracheobronchitis  Bronchiolitis  Non-specific wheezing              B. Indications

## 2024-01-31 NOTE — PROGRESS NOTES
Unable to complete pt's home med list at this time due to pt not knowing what medications she takes.

## 2024-01-31 NOTE — PROGRESS NOTES
TRANSFER - IN REPORT:    Verbal report received from SUSANNAH Caballero on Yolanda Young  being received from [facility] ED for routine progression of patient care      Report consisted of patient's Situation, Background, Assessment and   Recommendations(SBAR).     Information from the following report(s) ED Encounter Summary was reviewed with the receiving nurse.    Opportunity for questions and clarification was provided.      Assessment completed upon patient's arrival to unit and care assumed.

## 2024-01-31 NOTE — CARE COORDINATION
CM met with patient and family at bedside for initial CM assessment. Pt currently observation status with chest pain. MOON letter given and copy provided to patient. Pt alert and oriented x 4. Demographics, insurance, and PCP discussed and verified. Last PCP visit within 2 weeks. CM updated contact number for pt and emergency contact. Pt lives alone in 2 level home with 7 steps to enter, rail on right side. Home with tub/shower and walk-in shower. No current HH services reported in the home. DME of shower chair, cane, RW, and 02 reported in the home. CM asked family for company name of 02 provider. Family reported pt on 2.5L/Min 02 via NC at baseline. Pt reported independent with ADL's and an active  in the community PTA. Family visits pt daily to assist with meals and medications.     PT/OT ordered and no needs recommended.    CM to continue to follow for discharge planning.       01/31/24 1231   Service Assessment   Patient Orientation Alert and Oriented;Person;Place;Situation;Self   Cognition Alert   History Provided By Patient;Child/Family   Primary Caregiver Self   Accompanied By/Relationship daughter and daughter-in-law   Support Systems Family Members;Children   Patient's Healthcare Decision Maker is: Named in Scanned ACP Document   PCP Verified by CM Yes   Last Visit to PCP Within last 3 months   Prior Functional Level Independent in ADLs/IADLs   Current Functional Level Other (see comment)  (pending PT/OT evals)   Can patient return to prior living arrangement Yes   Ability to make needs known: Good   Family able to assist with home care needs: Yes   Would you like for me to discuss the discharge plan with any other family members/significant others, and if so, who? Yes  (daughter, daughter-in-law, and son)   Financial Resources Medicare;Other (Comment)  (Medicare (Primary) BCBS (Secondary))   Community Resources None   CM/SW Referral Other (see comment)  (pending clinical course)   Social/Functional  History   Lives With Alone   Type of Home House   Home Layout Two level;Able to Live on Main level with bedroom/bathroom   Home Access Stairs to enter with rails   Entrance Stairs - Number of Steps 7   Entrance Stairs - Rails Right   Bathroom Shower/Tub Walk-in shower   Bathroom Toilet Standard   Bathroom Equipment Shower chair   Home Equipment Cane;Walker, rolling;Oxygen   Receives Help From Family   ADL Assistance Independent   Homemaking Assistance Independent   Homemaking Responsibilities Yes   Ambulation Assistance Independent   Transfer Assistance Independent   Active  Yes   Occupation Retired   Discharge Planning   Type of Residence House   Living Arrangements Alone;Other (Comment)  (family checks on daily)   Current Services Prior To Admission Oxygen Therapy;Durable Medical Equipment   Current DME Prior to Arrival Oxygen Therapy (Comment);Cane;Walker;Shower Chair   Potential Assistance Needed Other (Comment)  (pending PT/OT evals)   DME Ordered? No   Potential Assistance Purchasing Medications No   Type of Home Care Services None   Patient expects to be discharged to: House   One/Two Story Residence Two story, live on first floor   Condition of Participation: Discharge Planning   The Plan for Transition of Care is related to the following treatment goals: Pt to return home with family when medically stable.

## 2024-01-31 NOTE — PROGRESS NOTES
ACUTE PHYSICAL THERAPY GOALS:   (Developed with and agreed upon by patient and/or caregiver.)   Patient will ambulate 200' with supervision within 1 treatment day.  -GOAL MET 1/31/24   Patient will ambulate up/down 5 steps with a railing and supervision within 1 treatment day.  -GOAL MET 1/31/24       PHYSICAL THERAPY Initial Assessment and Discharge  (Link to Caseload Tracking: PT Visit Days : 1  Acknowledge Orders  Time In/Out  PT Charge Capture  Rehab Caseload Tracker    Yolanda Yuong is a 89 y.o. female   PRIMARY DIAGNOSIS: Chest pain  Chest pain [R07.9]  Cardiac enzymes elevated [R74.8]  Chest pain, unspecified type [R07.9]       Reason for Referral: Generalized Muscle Weakness (M62.81)  Observation: Payor: MEDICARE / Plan: MEDICARE PART A AND B / Product Type: *No Product type* /     ASSESSMENT:     REHAB RECOMMENDATIONS:   Recommendation to date pending progress:  Setting:  No further skilled physical therapy after discharge from hospital    Equipment:    None     ASSESSMENT:  Ms. Young admitted with above diagnoses.  Patient lives alone and functions without an assistive device at baseline.  She has multiple children/family members who check on her regularly throughout the day.  Patient was able to manage her own bed mobility, transfers, and gait (including stairs) without a device.  She performed all activity on room air with sats not dropping below 94%.  Patient was short of breath at times but not an oxygenation issue.  Patient is not demonstrating any acute or d/c PT needs.       [facility] AM-PAC™ “6 Clicks” Basic Mobility Inpatient Short Form  AM-PAC Basic Mobility - Inpatient   How much help is needed turning from your back to your side while in a flat bed without using bedrails?: None  How much help is needed moving from lying on your back to sitting on the side of a flat bed without using bedrails?: None  How much help is needed moving to and from a bed to a chair?: None  How much help  is needed standing up from a chair using your arms?: None  How much help is needed walking in hospital room?: None  How much help is needed climbing 3-5 steps with a railing?: None  AM-PAC Inpatient Mobility Raw Score : 24  AM-PAC Inpatient T-Scale Score : 61.14  Mobility Inpatient CMS 0-100% Score: 0  Mobility Inpatient CMS G-Code Modifier : CH    SUBJECTIVE:   Ms. Young agreeable.     Social/Functional Lives With: Alone  Type of Home: House  Home Layout: Two level, Able to Live on Main level with bedroom/bathroom  Home Access: Stairs to enter with rails  Entrance Stairs - Number of Steps: 7  Entrance Stairs - Rails: Right  Bathroom Shower/Tub: Walk-in shower  Home Equipment: Cane    OBJECTIVE:     PAIN: VITALS / O2: PRECAUTION / LINES / DRAINS:   Pre Treatment: no complaints         Post Treatment: no complaints Vitals        Oxygen  O2 Therapy: Room air  SpO2: 95 %   Continuous Pulse Oximetry and Telemetry     RESTRICTIONS/PRECAUTIONS:                    GROSS EVALUATION:  Intact Impaired (Comments):   AROM [x]     PROM []    Strength []  Generally decreased, functional   Balance [] Sitting - Static: Good  Sitting - Dynamic: Good  Standing - Static: Good  Standing - Dynamic: Fair, +   Posture [] Forward Head  Rounded Shoulders   Sensation [x]     Coordination [x]      Tone [x]     Edema []    Activity Tolerance []  Some shortness of breath with activity    []      COGNITION/  PERCEPTION: Intact Impaired (Comments):   Orientation [x]     Vision [x]     Hearing [x]     Cognition  [x]       MOBILITY: I Mod I S SBA CGA Min Mod Max Total  NT x2 Comments:   Bed Mobility    Rolling [] [] [] [] [] [] [] [] [] [] []    Supine to Sit [] [] [x] [] [] [] [] [] [] [] []    Scooting [] [] [x] [] [] [] [] [] [] [] []    Sit to Supine [] [] [] [] [] [] [] [] [] [] []    Transfers    Sit to Stand [] [] [x] [] [] [] [] [] [] [] []    Bed to Chair [] [] [x] [] [] [] [] [] [] [] []    Stand to Sit [] [] [x] [] [] [] [] [] [] [] []   Education Given To: Patient  Education Provided: Role of Therapy;Plan of Care  Education Method: Verbal  Education Outcome: Verbalized understanding    TIME IN/OUT:  Time In: 1125  Time Out: 1155  Minutes: 30    Gayathri Michaels PT

## 2024-01-31 NOTE — H&P
[facility] Cardiology Initial Cardiac Evaluation      Date of  Admission: 1/30/2024  5:43 PM     Primary Care Physician: Jair Robles APRN - NP  Primary Cardiologist: CCC  Referring Physician: Dr Galicia  Supervising Physician: Dr Uribe    CC/Reason for evaluation: CP    HPI:  Yolanda Young is a 89 y.o. female admitted for Chest pain [R07.9]  Cardiac enzymes elevated [R74.8]  Chest pain, unspecified type [R07.9]. She has a h/o AS s/p TAVR, htn, COPD 2-3 L at night, CKD, GENEVA, CHB s/p St Hang DC PM, PAF on eliquis, dHF w 8/2023 echo w EF 55-65%, grade 2 DD, severe LAE, mild AS, mild-mod MR, mod pulmonary htn, bovine aortic valve w mean gradient 28 mmHg.  2/2022 LHC w normal coronaries, severe AS. Presented [facility] the [facility] ER 1/30/24 w CP and SOB.     In ER , K 3.8, cr 1.27, pBNP 1151, HS trop 47 and 49 and 47, WBC 5.6, hgb 9, platelets 174, CXR no acute findings, EKG a paced w rate 73. /101. Pt admitted by hospitalist.      Past Medical History:   Diagnosis Date    Anemia     CAD (coronary artery disease)     pacemaker    Cervical dystonia     Chronic kidney disease, stage III (moderate) (HCC)     Diaphragmatic hernia without mention of obstruction or gangrene     Dysphagia, late effect of cerebrovascular disease     Dysphasia, late effect of cerebrovascular disease     GERD (gastroesophageal reflux disease)     Gout     Hyperlipidemia     Pacemaker     Sick sinus syndrome (HCC) 9/10/2015    Spastic dysphonia       Past Surgical History:   Procedure Laterality Date    COLONOSCOPY  2015    dr mahmood    HYSTERECTOMY (CERVIX STATUS UNKNOWN)      ORTHOPEDIC SURGERY  2007    Left knee replacement    ORTHOPEDIC SURGERY  1998    Right foot surgery    ORTHOPEDIC SURGERY  2008    Right knee replacement    ORTHOPEDIC SURGERY  2009    Left Total Knee    PACEMAKER  2003, 2011    [place] UNLISTED PROCEDURE CARDIAC SURGERY         Allergies   Allergen Reactions    Amlodipine Angioedema     Swelling of lips     Lisinopril Anaphylaxis, Shortness Of Breath and Swelling     Lips and tongue swell    Omeprazole Anaphylaxis     Legs feel numb, feels like her lips are thicker     Oxycodone Hives     Mental incapacity      Social History     Socioeconomic History    Marital status:      Spouse name: Not on file    Number of children: Not on file    Years of education: Not on file    Highest education level: Not on file   Occupational History    Not on file   Tobacco Use    Smoking status: Former     Current packs/day: 0.00     Types: Cigarettes     Quit date: 1945     Years since quittin.1    Smokeless tobacco: Never    Tobacco comments:     Quit smoking: she attempt to smoke x 1 yr, did not smoke 1 total of 1 pk   Vaping Use    Vaping Use: Never used   Substance and Sexual Activity    Alcohol use: No     Alcohol/week: 0.0 standard drinks of alcohol    Drug use: No    Sexual activity: Not on file   Other Topics Concern    Not on file   Social History Narrative    She denies ETOH use.  She has a 15 pack year history of tobacco smoking but quit in .  There is no history of toxic industrial or TB exposure.  She is  and resides alone.     Social Determinants of Health     Financial Resource Strain: Not on file   Food Insecurity: No Food Insecurity (2024)    Hunger Vital Sign     Worried About Running Out of Food in the Last Year: Never true     Ran Out of Food in the Last Year: Never true   Transportation Needs: No Transportation Needs (2024)    PRAPARE - Transportation     Lack of Transportation (Medical): No     Lack of Transportation (Non-Medical): No   Physical Activity: Not on file   Stress: Not on file   Social Connections: Not on file   Intimate Partner Violence: Not on file   Housing Stability: Low Risk  (2024)    Housing Stability Vital Sign     Unable to Pay for Housing in the Last Year: No     Number of Places Lived in the Last Year: 1     Unstable Housing in the Last Year: No  (TYLENOL) suppository 650 mg  650 mg Rectal Q6H PRN       Review of Symptoms:  ROS    General: no weight change,  no weakness, fever or chills  Skin: no rashes, lumps, or other skin changes  HEENT: no headache, dizziness, lightheadedness, vision changes, hearing changes, tinnitus, vertigo, sinus pressure/pain, bleeding gums, sore throat, or hoarseness  Neck: no swollen glands, goiter, pain or stiffness  Respiratory: no cough, sputum, hemoptysis, + dyspnea, wheezing  Cardiovascular: + as per HPI  Gastrointestinal: no GERD, constipation, diarrhea, liver problems, or h/o GI bleed  Urinary: no frequency, urgency , hematuria, burning/pain with urination, recent flank pain, polyuria, nocturia, or difficulty urinating  Peripheral Vascular: no claudication, leg cramps, prior DVTs, swelling of calves, legs, or feet, color change, or swelling with redness or tenderness  Musculoskeletal: no muscle or joint pain/stiffness, joint swelling, erythema of joints, or back pain  Psychiatric: no depression or excessive stress  Neurological: no sensory or motor loss, seizures, syncope, tremors, numbness, no dementia  Hematologic: + anemia   Endocrine: no thyroid problems, heat or cold intolerance, excessive sweating, polyuria, polydipsia, no  diabetes.       Subjective:        Physical Exam  Vitals:    01/31/24 0721 01/31/24 0745 01/31/24 1232 01/31/24 1812   BP:  (!) 172/99  (!) 176/81   Pulse: 74 67  80   Resp: 17 18     Temp:  98.2 °F (36.8 °C)     TempSrc:  Oral     SpO2: 99% 100% 95%    Weight:       Height:           Physical Exam:  General: Well Developed, Well Nourished, No Acute Distress  Head: normocephalic atraumatic   ENT: pupils equal and round, no abnormalities noted  Neck: supple, no JVD, no carotid bruits  Heart: S1S2 with RRR without murmurs or gallops  Lungs: Clear throughout auscultation bilaterally without adventitious sounds  Abd: soft, nontender, nondistended, with good bowel sounds  Ext: warm, no edema  Skin: warm

## 2024-01-31 NOTE — PROGRESS NOTES
Hospitalist Progress Note   Admit Date:  2024  5:43 PM   Name:  Yolanda Young   Age:  89 y.o.  Sex:  female  :  1931   MRN:  [id number]   Room:  [facility]    Presenting/Chief Complaint: Chest Pain and Shortness of Breath     Reason(s) for Admission: Chest pain [R07.9]  Cardiac enzymes elevated [R74.8]  Chest pain, unspecified type [R07.9]     Hospital Course:   Yolanda Young is a 89 y.o. female with medical history of heart block status post PPM, hypertension, COPD, aortic stenosis status post TAVR, COPD, heart failure who presented with chest pain and worsening shortness of breath.  States she normally uses 2 L O2 at night but has required oxygen during the day.  Is a very active 89-year-old and until recently was mowing her lawn.  Came in due to intermittent chest pain with exertion.  Hypertension noted in the ER, mildly elevated troponin, creatinine 1.37, proBNP 1151, chest x-ray normal.      Subjective & 24hr Events:   On 2 L nasal cannula this morning.  Daughter is at the bedside.  She was dyspneic this morning but more comfortable through the course of the day.    Assessment & Plan:     Principal Problem:    Chest pain  Elevated troponin  Do not suspect ACS event  Will follow-up TTE  Patient has multiple comorbidities including history of aortic stenosis, CHF, and COPD in addition to deconditioning  EKG findings stable  Chest pain has resolved  Will monitor on telemetry    Active Problems:    S/P TAVR (transcatheter aortic valve replacement)    Grade II diastolic dysfunction  Heart block status post PPM    CAD (coronary artery disease)  Continue aspirin and Eliquis      Essential hypertension  Continue hydralazine 100 mg 3 times daily, carvedilol 25 twice daily, losartan 100 mg daily  Increased Lasix 20 mg to 40 mg  Will monitor for efficacy and add Imdur       GERD (gastroesophageal reflux disease)  Continue famotidine      Iron deficiency anemia  Resume iron supplementation  Monitor  hemoglobin on CBC  No overt signs or symptoms of bleeding      Chronic kidney disease, stage III (moderate) (HCC)  Baseline creatinine appears to be between 1.1-1.4  Creatinine currently at baseline  Continue to monitor renal function and urine output with increase in Lasix dosing      COPD, mild (HCC)  Resume Pulmicort and DuoNebs  Do not suspect exacerbation      PT/OT evals and PPD needed/ordered?  Yes  Diet:  ADULT DIET; Regular; 3 carb choices (45 gm/meal); Low Fat/Low Chol/High Fiber/CRISTINA; Low Sodium (2 gm)  VTE prophylaxis: Already on anticoagulation  Code status: Full Code      Non-peripheral Lines and Tubes (if present):          Telemetry (if present):  Cardiac/Telemetry Monitor On: Portable telemetry pack applied        Hospital Problems:  Principal Problem:    Chest pain  Active Problems:    S/P TAVR (transcatheter aortic valve replacement)    Grade II diastolic dysfunction    Hyperlipidemia    Class 1 obesity    CAD (coronary artery disease)    Essential hypertension with goal blood pressure less than 140/90    GERD (gastroesophageal reflux disease)    Iron deficiency anemia    Chronic kidney disease, stage III (moderate) (HCC)    COPD, mild (HCC)    Elevated troponin  Resolved Problems:    Normocytic anemia      Objective:   Patient Vitals for the past 24 hrs:   Temp Pulse Resp BP SpO2   01/31/24 1812 -- 80 -- (!) 176/81 --   01/31/24 1232 -- -- -- -- 95 %   01/31/24 0745 98.2 °F (36.8 °C) 67 18 (!) 172/99 100 %   01/31/24 0721 -- 74 17 -- 99 %   01/31/24 0433 97.9 °F (36.6 °C) 67 18 (!) 150/83 100 %   01/30/24 2257 98.4 °F (36.9 °C) 73 18 (!) 173/101 100 %   01/30/24 2122 -- 71 19 -- 97 %       Oxygen Therapy  SpO2: 95 %  Pulse via Oximetry: 68 beats per minute  Pulse Oximeter Device Mode: Intermittent  O2 Device: Nasal cannula  O2 Flow Rate (L/min): 2.5 L/min    Estimated body mass index is 29.63 kg/m² as calculated from the following:    Height as of this encounter: 1.575 m (5' 2\").    Weight as of

## 2024-01-31 NOTE — ACP (ADVANCE CARE PLANNING)
Advance Care Planning     General Advance Care Planning (ACP) Conversation    Date of Conversation: 1/30/2024  Conducted with: Patient with Decision Making Capacity    Healthcare Decision Maker:    Primary Decision Maker: Yajaira David - Child - [phone]    Secondary Decision Maker: Jorje Young - Child - [phone]    Supplemental (Other) Decision Maker: Milagros Young - Other - [phone]  Click here to complete Healthcare Decision Makers including selection of the Healthcare Decision Maker Relationship (ie \"Primary\").  Today we documented Decision Maker(s) consistent with ACP documents on file.    Content/Action Overview:  Has ACP document(s) on file - reflects the patient's care preferences  Reviewed DNR/DNI and patient elects Full Code (Attempt Resuscitation)    Length of Voluntary ACP Conversation in minutes:  <16 minutes (Non-Billable)    LADI LEARY

## 2024-01-31 NOTE — H&P
[facility] Hospitalist Initial History and Physical Note    Patient: Yolanda Young Date: 1/30/2024  female, 89 y.o.  Admit Date: 1/30/2024  Attending: Scott Jarvis MD     ASSESSMENT AND PLAN:     Principal Problem:    Chest pain  Plan: EKG and troponin not remarkable for ACS. Will observe on remote tele. Morphine prn, trend troponins. Consult cardiology.  Active Problems:    RUSS (acute kidney injury) (HCC)  Plan: Gentle IVF, follow BMP.    Iron deficiency anemia  Plan: Follow CBC    Chronic kidney disease, stage III (moderate) (HCC)  Plan: Per above    S/P TAVR (transcatheter aortic valve replacement)  Plan: Stable    Hyperlipidemia  Plan: Stable. Continue home meds.    Class 1 obesity  Plan: Stable    CAD (coronary artery disease)  Plan: Stable. Continue home meds.    Essential hypertension with goal blood pressure less than 140/90  Plan: Stable. Continue home meds.    GERD (gastroesophageal reflux disease)  Plan: Stable. Continue home meds.    COPD, mild (HCC)  Plan: Stable. Continue home meds.    Grade II diastolic dysfunction  Plan: Stable. Continue home meds.         DVT Prophylaxis: Eliquis      Code Status: FULL CODE      Disposition: Observe on remote tele for evaluation and treatment as per above.      Anticipated discharge: < 2 midnights     CHIEF COMPLAINT:  chest pain    HISTORY OF PRESENT ILLNESS:      Patient Active Problem List   Diagnosis    Dysphasia, late effect of cerebrovascular disease    Long term (current) use of opiate analgesic    Long term (current) use of non-steroidal anti-inflammatories (nsaid)    Hyperlipidemia    CHB (complete heart block) (HCC)    Chronic gouty arthritis    Class 1 obesity    Dilated cardiomyopathy (HCC)    Exertional dyspnea    Vitamin D deficiency    Hypoxemia    Sick sinus syndrome (HCC)    CAD (coronary artery disease)    Arthritis    Moderate mitral regurgitation    Murmur    Colon neoplasm    Chronic kidney disease    Hypoxia    Peripheral neuropathic pain

## 2024-01-31 NOTE — PROGRESS NOTES
ACUTE OCCUPATIONAL THERAPY GOALS:   (Developed with and agreed upon by patient and/or caregiver.)  Pt will be able to perform self care with stand by to minimal assistance and ambulate short distances with family that is capable of assisting patient.       OCCUPATIONAL THERAPY Initial Assessment, Daily Note, Discharge, and AM       OT Visit Days: 1  Acknowledge Orders  Time  OT Charge Capture  Rehab Caseload Tracker      Yolanda Young is a 89 y.o. female   PRIMARY DIAGNOSIS: Chest pain  Chest pain [R07.9]  Cardiac enzymes elevated [R74.8]  Chest pain, unspecified type [R07.9]       Reason for Referral: Generalized Muscle Weakness (M62.81)  Observation: Payor: MEDICARE / Plan: MEDICARE PART A AND B / Product Type: *No Product type* /     ASSESSMENT:     REHAB RECOMMENDATIONS:   Recommendation to date pending progress:  Setting:  No further skilled occupational therapy after discharge from hospital    Equipment:    None     ASSESSMENT:  Ms. Young was admitted with above diagnosis. Pt lives alone and is independent at baseline. She has a supportive family that assist as needed. Pt seen in room with family present and was up in room and bathroom for toileting and grooming with supervision and verbal cues for safety. Pt ambulated community distance, with some SOB noted but oxygen remained above 94% on room air. Pt should do well at home with family support, no further OT warranted.      [facility] AM-PAC™ “6 Clicks” Daily Activity Inpatient Short Form:                SUBJECTIVE:     Ms. Young states, \"I can do it\"     Social/Functional Lives With: Alone  Type of Home: House  Home Layout: Two level, Able to Live on Main level with bedroom/bathroom  Home Access: Stairs to enter with rails  Entrance Stairs - Number of Steps: 7  Entrance Stairs - Rails: Right  Bathroom Shower/Tub: Walk-in shower  Home Equipment: Cane    OBJECTIVE:     LINES / DRAINS / AIRWAY: None    RESTRICTIONS/PRECAUTIONS:       PAIN:  VITALS / O2:   Pre Treatment:          Post Treatment: none       Vitals          Oxygen            GROSS EVALUATION: INTACT IMPAIRED   (See Comments)   UE AROM [x] []   UE PROM [x] []   Strength [x]       Posture / Balance [x]     Sensation [x]     Coordination [x]       Tone [x]       Edema []    Activity Tolerance [x]       Hand Dominance R [] L []      COGNITION/  PERCEPTION: INTACT IMPAIRED   (See Comments)   Orientation [x]     Vision [x]     Hearing [x]     Cognition  [x]     Perception [x]       MOBILITY: I Mod I S SBA CGA Min Mod Max Total  NT x2 Comments:   Bed Mobility    Rolling [] [] [] [] [] [] [] [] [] [] []    Supine to Sit [] [] [x] [] [] [] [] [] [] [] []    Scooting [] [] [x] [] [] [] [] [] [] [] []    Sit to Supine [] [] [x] [] [] [] [] [] [] [] []    Transfers    Sit to Stand [] [] [x] [] [] [] [] [] [] [] []    Bed to Chair [] [] [x] [] [] [] [] [] [] [] []    Stand to Sit [] [] [x] [] [] [] [] [] [] [] []    Tub/Shower [] [] [x] [] [] [] [] [] [] [] []     Toilet [] [] [x] [] [] [] [] [] [] [] []      [] [] [] [] [] [] [] [] [] [] []    I=Independent, Mod I=Modified Independent, S=Supervision/Setup, SBA=Standby Assistance, CGA=Contact Guard Assistance, Min=Minimal Assistance, Mod=Moderate Assistance, Max=Maximal Assistance, Total=Total Assistance, NT=Not Tested    ACTIVITIES OF DAILY LIVING: I Mod I S SBA CGA Min Mod Max Total NT Comments   BASIC ADLs:              Upper Body Bathing  [] [] [x] [] [] [] [] [] [] []     Lower Body Bathing [] [] [x] [] [] [] [] [] [] []     Toileting [] [] [x] [] [] [] [] [] [] []    Upper Body Dressing [] [] [x] [] [] [] [] [] [] []    Lower Body Dressing [] [] [x] [] [] [] [] [] [] []    Feeding [x] [] [] [] [] [] [] [] [] []    Grooming [] [] [x] [] [] [] [] [] [] []    Personal Device Care [] [] [] [] [] [] [] [] [] []    Functional Mobility [] [] [x] [] [] [] [] [] [] []    I=Independent, Mod I=Modified Independent, S=Supervision/Setup, SBA=Standby

## 2024-02-01 PROBLEM — R06.00 DYSPNEA: Status: ACTIVE | Noted: 2024-02-01

## 2024-02-01 LAB
ALBUMIN SERPL-MCNC: 3.1 G/DL (ref 3.2–4.6)
ALBUMIN/GLOB SERPL: 0.9 (ref 0.4–1.6)
ALP SERPL-CCNC: 55 U/L (ref 50–136)
ALT SERPL-CCNC: 12 U/L (ref 12–65)
ANION GAP SERPL CALC-SCNC: 0 MMOL/L (ref 2–11)
AST SERPL-CCNC: 11 U/L (ref 15–37)
BASOPHILS # BLD: 0 K/UL (ref 0–0.2)
BASOPHILS NFR BLD: 0 % (ref 0–2)
BILIRUB SERPL-MCNC: 0.6 MG/DL (ref 0.2–1.1)
BUN SERPL-MCNC: 23 MG/DL (ref 8–23)
CALCIUM SERPL-MCNC: 9.4 MG/DL (ref 8.3–10.4)
CHLORIDE SERPL-SCNC: 106 MMOL/L (ref 103–113)
CO2 SERPL-SCNC: 34 MMOL/L (ref 21–32)
CREAT SERPL-MCNC: 1.3 MG/DL (ref 0.6–1)
DIFFERENTIAL METHOD BLD: ABNORMAL
EOSINOPHIL # BLD: 0.1 K/UL (ref 0–0.8)
EOSINOPHIL NFR BLD: 3 % (ref 0.5–7.8)
ERYTHROCYTE [DISTWIDTH] IN BLOOD BY AUTOMATED COUNT: 16.3 % (ref 11.9–14.6)
GLOBULIN SER CALC-MCNC: 3.3 G/DL (ref 2.8–4.5)
GLUCOSE SERPL-MCNC: 98 MG/DL (ref 65–100)
HCT VFR BLD AUTO: 31 % (ref 35.8–46.3)
HGB BLD-MCNC: 9.3 G/DL (ref 11.7–15.4)
IMM GRANULOCYTES # BLD AUTO: 0 K/UL (ref 0–0.5)
IMM GRANULOCYTES NFR BLD AUTO: 0 % (ref 0–5)
LYMPHOCYTES # BLD: 1 K/UL (ref 0.5–4.6)
LYMPHOCYTES NFR BLD: 19 % (ref 13–44)
MCH RBC QN AUTO: 25.5 PG (ref 26.1–32.9)
MCHC RBC AUTO-ENTMCNC: 30 G/DL (ref 31.4–35)
MCV RBC AUTO: 85.2 FL (ref 82–102)
MONOCYTES # BLD: 0.5 K/UL (ref 0.1–1.3)
MONOCYTES NFR BLD: 9 % (ref 4–12)
NEUTS SEG # BLD: 3.6 K/UL (ref 1.7–8.2)
NEUTS SEG NFR BLD: 69 % (ref 43–78)
NRBC # BLD: 0 K/UL (ref 0–0.2)
PLATELET # BLD AUTO: 200 K/UL (ref 150–450)
PMV BLD AUTO: 9.7 FL (ref 9.4–12.3)
POTASSIUM SERPL-SCNC: 4.3 MMOL/L (ref 3.5–5.1)
PROT SERPL-MCNC: 6.4 G/DL (ref 6.3–8.2)
RBC # BLD AUTO: 3.64 M/UL (ref 4.05–5.2)
SODIUM SERPL-SCNC: 140 MMOL/L (ref 136–146)
WBC # BLD AUTO: 5.2 K/UL (ref 4.3–11.1)

## 2024-02-01 PROCEDURE — 99232 SBSQ HOSP IP/OBS MODERATE 35: CPT | Performed by: INTERNAL MEDICINE

## 2024-02-01 PROCEDURE — 6370000000 HC RX 637 (ALT 250 FOR IP): Performed by: STUDENT IN AN ORGANIZED HEALTH CARE EDUCATION/TRAINING PROGRAM

## 2024-02-01 PROCEDURE — 85025 COMPLETE CBC W/AUTO DIFF WBC: CPT

## 2024-02-01 PROCEDURE — 80053 COMPREHEN METABOLIC PANEL: CPT

## 2024-02-01 PROCEDURE — 94761 N-INVAS EAR/PLS OXIMETRY MLT: CPT

## 2024-02-01 PROCEDURE — 36415 COLL VENOUS BLD VENIPUNCTURE: CPT

## 2024-02-01 PROCEDURE — 6370000000 HC RX 637 (ALT 250 FOR IP): Performed by: FAMILY MEDICINE

## 2024-02-01 PROCEDURE — 6360000002 HC RX W HCPCS: Performed by: FAMILY MEDICINE

## 2024-02-01 PROCEDURE — 1100000003 HC PRIVATE W/ TELEMETRY

## 2024-02-01 PROCEDURE — 2580000003 HC RX 258: Performed by: FAMILY MEDICINE

## 2024-02-01 PROCEDURE — 6370000000 HC RX 637 (ALT 250 FOR IP): Performed by: INTERNAL MEDICINE

## 2024-02-01 PROCEDURE — 94640 AIRWAY INHALATION TREATMENT: CPT

## 2024-02-01 RX ORDER — FUROSEMIDE 20 MG/1
20 TABLET ORAL DAILY
Status: DISCONTINUED | OUTPATIENT
Start: 2024-02-02 | End: 2024-02-02 | Stop reason: HOSPADM

## 2024-02-01 RX ORDER — SPIRONOLACTONE 25 MG/1
25 TABLET ORAL DAILY
Status: DISCONTINUED | OUTPATIENT
Start: 2024-02-01 | End: 2024-02-02 | Stop reason: HOSPADM

## 2024-02-01 RX ADMIN — HYDRALAZINE HYDROCHLORIDE 100 MG: 50 TABLET, FILM COATED ORAL at 21:37

## 2024-02-01 RX ADMIN — FUROSEMIDE 40 MG: 40 TABLET ORAL at 07:42

## 2024-02-01 RX ADMIN — FERROUS SULFATE TAB 325 MG (65 MG ELEMENTAL FE) 325 MG: 325 (65 FE) TAB at 07:42

## 2024-02-01 RX ADMIN — HYDRALAZINE HYDROCHLORIDE 100 MG: 50 TABLET, FILM COATED ORAL at 07:41

## 2024-02-01 RX ADMIN — FAMOTIDINE 20 MG: 20 TABLET, FILM COATED ORAL at 21:38

## 2024-02-01 RX ADMIN — LOSARTAN POTASSIUM 100 MG: 50 TABLET, FILM COATED ORAL at 07:42

## 2024-02-01 RX ADMIN — IPRATROPIUM BROMIDE AND ALBUTEROL SULFATE 1 DOSE: 2.5; .5 SOLUTION RESPIRATORY (INHALATION) at 07:33

## 2024-02-01 RX ADMIN — APIXABAN 5 MG: 5 TABLET, FILM COATED ORAL at 07:41

## 2024-02-01 RX ADMIN — SPIRONOLACTONE 25 MG: 25 TABLET ORAL at 20:01

## 2024-02-01 RX ADMIN — CARVEDILOL 25 MG: 25 TABLET, FILM COATED ORAL at 16:46

## 2024-02-01 RX ADMIN — HYDRALAZINE HYDROCHLORIDE 100 MG: 50 TABLET, FILM COATED ORAL at 16:46

## 2024-02-01 RX ADMIN — OXYBUTYNIN CHLORIDE 5 MG: 5 TABLET, EXTENDED RELEASE ORAL at 21:38

## 2024-02-01 RX ADMIN — IPRATROPIUM BROMIDE AND ALBUTEROL SULFATE 1 DOSE: 2.5; .5 SOLUTION RESPIRATORY (INHALATION) at 20:08

## 2024-02-01 RX ADMIN — CARVEDILOL 25 MG: 25 TABLET, FILM COATED ORAL at 07:42

## 2024-02-01 RX ADMIN — IPRATROPIUM BROMIDE AND ALBUTEROL SULFATE 1 DOSE: 2.5; .5 SOLUTION RESPIRATORY (INHALATION) at 14:06

## 2024-02-01 RX ADMIN — FOLIC ACID 1 MG: 1 TABLET ORAL at 07:42

## 2024-02-01 RX ADMIN — APIXABAN 5 MG: 5 TABLET, FILM COATED ORAL at 21:38

## 2024-02-01 RX ADMIN — SODIUM CHLORIDE, PRESERVATIVE FREE 10 ML: 5 INJECTION INTRAVENOUS at 07:49

## 2024-02-01 RX ADMIN — BUDESONIDE 1000 MCG: 0.5 INHALANT RESPIRATORY (INHALATION) at 07:33

## 2024-02-01 RX ADMIN — ASPIRIN 81 MG: 81 TABLET, COATED ORAL at 07:41

## 2024-02-01 RX ADMIN — SODIUM CHLORIDE, PRESERVATIVE FREE 10 ML: 5 INJECTION INTRAVENOUS at 21:40

## 2024-02-01 ASSESSMENT — PAIN SCALES - GENERAL: PAINLEVEL_OUTOF10: 0

## 2024-02-02 VITALS
WEIGHT: 174.8 LBS | HEART RATE: 67 BPM | RESPIRATION RATE: 16 BRPM | HEIGHT: 62 IN | DIASTOLIC BLOOD PRESSURE: 81 MMHG | TEMPERATURE: 98.2 F | OXYGEN SATURATION: 95 % | SYSTOLIC BLOOD PRESSURE: 151 MMHG | BODY MASS INDEX: 32.17 KG/M2

## 2024-02-02 LAB
ALBUMIN SERPL-MCNC: 3.1 G/DL (ref 3.2–4.6)
ALBUMIN/GLOB SERPL: 0.9 (ref 0.4–1.6)
ALP SERPL-CCNC: 58 U/L (ref 50–136)
ALT SERPL-CCNC: 12 U/L (ref 12–65)
ANION GAP SERPL CALC-SCNC: 1 MMOL/L (ref 2–11)
AST SERPL-CCNC: 10 U/L (ref 15–37)
BASOPHILS # BLD: 0 K/UL (ref 0–0.2)
BASOPHILS NFR BLD: 0 % (ref 0–2)
BILIRUB SERPL-MCNC: 0.4 MG/DL (ref 0.2–1.1)
BUN SERPL-MCNC: 21 MG/DL (ref 8–23)
CALCIUM SERPL-MCNC: 9.7 MG/DL (ref 8.3–10.4)
CHLORIDE SERPL-SCNC: 104 MMOL/L (ref 103–113)
CO2 SERPL-SCNC: 34 MMOL/L (ref 21–32)
CREAT SERPL-MCNC: 1.37 MG/DL (ref 0.6–1)
DIFFERENTIAL METHOD BLD: ABNORMAL
EOSINOPHIL # BLD: 0.2 K/UL (ref 0–0.8)
EOSINOPHIL NFR BLD: 3 % (ref 0.5–7.8)
ERYTHROCYTE [DISTWIDTH] IN BLOOD BY AUTOMATED COUNT: 16.3 % (ref 11.9–14.6)
GLOBULIN SER CALC-MCNC: 3.4 G/DL (ref 2.8–4.5)
GLUCOSE SERPL-MCNC: 104 MG/DL (ref 65–100)
HCT VFR BLD AUTO: 31.5 % (ref 35.8–46.3)
HGB BLD-MCNC: 9.6 G/DL (ref 11.7–15.4)
IMM GRANULOCYTES # BLD AUTO: 0.1 K/UL (ref 0–0.5)
IMM GRANULOCYTES NFR BLD AUTO: 1 % (ref 0–5)
LYMPHOCYTES # BLD: 1.1 K/UL (ref 0.5–4.6)
LYMPHOCYTES NFR BLD: 21 % (ref 13–44)
MCH RBC QN AUTO: 25.5 PG (ref 26.1–32.9)
MCHC RBC AUTO-ENTMCNC: 30.5 G/DL (ref 31.4–35)
MCV RBC AUTO: 83.8 FL (ref 82–102)
MONOCYTES # BLD: 0.5 K/UL (ref 0.1–1.3)
MONOCYTES NFR BLD: 9 % (ref 4–12)
NEUTS SEG # BLD: 3.6 K/UL (ref 1.7–8.2)
NEUTS SEG NFR BLD: 65 % (ref 43–78)
NRBC # BLD: 0 K/UL (ref 0–0.2)
PLATELET # BLD AUTO: 211 K/UL (ref 150–450)
PMV BLD AUTO: 9.5 FL (ref 9.4–12.3)
POTASSIUM SERPL-SCNC: 4.3 MMOL/L (ref 3.5–5.1)
PROT SERPL-MCNC: 6.5 G/DL (ref 6.3–8.2)
RBC # BLD AUTO: 3.76 M/UL (ref 4.05–5.2)
SODIUM SERPL-SCNC: 139 MMOL/L (ref 136–146)
WBC # BLD AUTO: 5.4 K/UL (ref 4.3–11.1)

## 2024-02-02 PROCEDURE — 85025 COMPLETE CBC W/AUTO DIFF WBC: CPT

## 2024-02-02 PROCEDURE — 6370000000 HC RX 637 (ALT 250 FOR IP): Performed by: INTERNAL MEDICINE

## 2024-02-02 PROCEDURE — 80053 COMPREHEN METABOLIC PANEL: CPT

## 2024-02-02 PROCEDURE — 94640 AIRWAY INHALATION TREATMENT: CPT

## 2024-02-02 PROCEDURE — 6370000000 HC RX 637 (ALT 250 FOR IP): Performed by: STUDENT IN AN ORGANIZED HEALTH CARE EDUCATION/TRAINING PROGRAM

## 2024-02-02 PROCEDURE — 36415 COLL VENOUS BLD VENIPUNCTURE: CPT

## 2024-02-02 PROCEDURE — 6370000000 HC RX 637 (ALT 250 FOR IP): Performed by: FAMILY MEDICINE

## 2024-02-02 RX ORDER — LOSARTAN POTASSIUM 100 MG/1
100 TABLET ORAL DAILY
Qty: 90 TABLET | Refills: 0 | Status: SHIPPED | OUTPATIENT
Start: 2024-02-02

## 2024-02-02 RX ORDER — CARVEDILOL 25 MG/1
25 TABLET ORAL 2 TIMES DAILY WITH MEALS
Qty: 180 TABLET | Refills: 0 | Status: SHIPPED | OUTPATIENT
Start: 2024-02-02

## 2024-02-02 RX ORDER — SPIRONOLACTONE 25 MG/1
25 TABLET ORAL DAILY
Qty: 90 TABLET | Refills: 0 | Status: SHIPPED | OUTPATIENT
Start: 2024-02-02

## 2024-02-02 RX ADMIN — SPIRONOLACTONE 25 MG: 25 TABLET ORAL at 09:13

## 2024-02-02 RX ADMIN — HYDRALAZINE HYDROCHLORIDE 100 MG: 50 TABLET, FILM COATED ORAL at 09:13

## 2024-02-02 RX ADMIN — ASPIRIN 81 MG: 81 TABLET, COATED ORAL at 09:13

## 2024-02-02 RX ADMIN — IPRATROPIUM BROMIDE AND ALBUTEROL SULFATE 1 DOSE: 2.5; .5 SOLUTION RESPIRATORY (INHALATION) at 06:40

## 2024-02-02 RX ADMIN — CARVEDILOL 25 MG: 25 TABLET, FILM COATED ORAL at 09:13

## 2024-02-02 RX ADMIN — APIXABAN 5 MG: 5 TABLET, FILM COATED ORAL at 09:13

## 2024-02-02 RX ADMIN — FERROUS SULFATE TAB 325 MG (65 MG ELEMENTAL FE) 325 MG: 325 (65 FE) TAB at 09:13

## 2024-02-02 RX ADMIN — FUROSEMIDE 20 MG: 20 TABLET ORAL at 09:13

## 2024-02-02 RX ADMIN — FOLIC ACID 1 MG: 1 TABLET ORAL at 09:13

## 2024-02-02 RX ADMIN — LOSARTAN POTASSIUM 100 MG: 50 TABLET, FILM COATED ORAL at 09:13

## 2024-02-02 NOTE — DISCHARGE SUMMARY
Hospitalist Discharge Summary   Admit Date:  2024  5:43 PM   DC Note date: 2024  Name:  Yolanda Young   Age:  89 y.o.  Sex:  female  :  1931   MRN:  [id number]   Room:  [facility]  PCP:  Jair Robles APRN - NP    Presenting Complaint: Chest Pain and Shortness of Breath     Initial Admission Diagnosis: Chest pain [R07.9]  Cardiac enzymes elevated [R74.8]  Chest pain, unspecified type [R07.9]  Dyspnea [R06.00]     Problem List for this Hospitalization (present on admission):    Principal Problem:    Chest pain  Active Problems:    S/P TAVR (transcatheter aortic valve replacement)    Grade II diastolic dysfunction    Hyperlipidemia    Class 1 obesity    CAD (coronary artery disease)    Essential hypertension with goal blood pressure less than 140/90    GERD (gastroesophageal reflux disease)    Iron deficiency anemia    Chronic kidney disease, stage III (moderate) (HCC)    COPD, mild (HCC)    Elevated troponin    Dyspnea  Resolved Problems:    Normocytic anemia      Hospital Course:    Yolanda Young is a 89 y.o. female with medical history of COPD, nocturnal O2 use, s/p heart block  PPM, aortic stenosis s/p TAVR, HTN, CHF, pAFIB on eliquis admitted with dyspnea/ chest pain.     CXR negative   On eliquis longterm  Mildly elevated troponin  Seen by cardiology who felt chest pain was more muscular or GERD related  This has since resolved     ECHO shows stable AV function, preserved EF, diastolic dysfunction , RVSP 41    Her antihypertensives have been adjusted and blood pressure improved.  She will need 1 week BMP check in light of adding aldactone to her regimen.     She also has a stable baseline anemia that will need followup.     Lives alone  Has local family   Very independent  Declines home health         Disposition: Home  Diet: ADULT DIET; Regular; 3 carb choices (45 gm/meal); Low Fat/Low Chol/High Fiber/CRISTINA; Low Sodium (2 gm)  Code Status: Full Code    Follow Ups:  Follow-up Information   3.4 2.8 - 4.5 g/dL    Albumin/Globulin Ratio 0.9 0.4 - 1.6     CBC with Auto Differential    Collection Time: 02/02/24  5:53 AM   Result Value Ref Range    WBC 5.4 4.3 - 11.1 K/uL    RBC 3.76 (L) 4.05 - 5.2 M/uL    Hemoglobin 9.6 (L) 11.7 - 15.4 g/dL    Hematocrit 31.5 (L) 35.8 - 46.3 %    MCV 83.8 82.0 - 102.0 FL    MCH 25.5 (L) 26.1 - 32.9 PG    MCHC 30.5 (L) 31.4 - 35.0 g/dL    RDW 16.3 (H) 11.9 - 14.6 %    Platelets 211 150 - 450 K/uL    MPV 9.5 9.4 - 12.3 FL    nRBC 0.00 0.0 - 0.2 K/uL    Differential Type AUTOMATED      Neutrophils % 65 43 - 78 %    Lymphocytes % 21 13 - 44 %    Monocytes % 9 4.0 - 12.0 %    Eosinophils % 3 0.5 - 7.8 %    Basophils % 0 0.0 - 2.0 %    Immature Granulocytes 1 0.0 - 5.0 %    Neutrophils Absolute 3.6 1.7 - 8.2 K/UL    Lymphocytes Absolute 1.1 0.5 - 4.6 K/UL    Monocytes Absolute 0.5 0.1 - 1.3 K/UL    Eosinophils Absolute 0.2 0.0 - 0.8 K/UL    Basophils Absolute 0.0 0.0 - 0.2 K/UL    Absolute Immature Granulocyte 0.1 0.0 - 0.5 K/UL       Allergies   Allergen Reactions    Amlodipine Angioedema     Swelling of lips    Lisinopril Anaphylaxis, Shortness Of Breath and Swelling     Lips and tongue swell    Omeprazole Anaphylaxis     Legs feel numb, feels like her lips are thicker     Oxycodone Hives     Mental incapacity     Immunization History   Administered Date(s) Administered    COVID-19, PFIZER PURPLE top, DILUTE for use, (age 12 y+), 30mcg/0.3mL 02/02/2021, 02/24/2021, 10/23/2021    COVID-19, PFIZER, (2023-24 formula), (age 12y+), IM, 30mcg/0.3mL 10/25/2023    Influenza Trivalent 10/15/2015, 11/01/2016    Influenza Virus Vaccine 10/21/2014, 10/01/2022    Influenza, High Dose (Fluzone 65 yrs and older) 10/17/2017, 10/24/2018, 09/01/2020, 10/01/2021    Influenza, Triv, inactivated, subunit, adjuvanted, IM (Fluad 65 yrs and older) 10/24/2019    Pneumococcal Vaccine 10/01/2014    Pneumococcal, PCV-13, PREVNAR 13, (age 6w+), IM, 0.5mL 08/10/2015, 10/15/2015       Recent Vital

## 2024-02-02 NOTE — PROGRESS NOTES
[facility] CARDIOLOGY PROGRESS NOTE           2/1/2024 6:20 PM    Admit Date: 1/30/2024      Subjective:     No overnight events.  Denies any chest discomfort. On room air    ROS:  Cardiovascular:  As noted above    Objective:      Vitals:    02/01/24 0735 02/01/24 0758 02/01/24 1144 02/01/24 1407   BP:  (!) 176/102 (!) 160/88    Pulse: 72 73 80 77   Resp: 17 16  18   Temp:  98.7 °F (37.1 °C)     TempSrc:  Oral     SpO2: 96% 97% 96% 98%   Weight:       Height:           Physical Exam:  General-No Acute Distress  Neck- supple, no JVD  CV- regular rate and rhythm; low grade ILYA at RUSB  Lung- clear bilaterally  Abd- soft, nontender, nondistended  Ext- no edema bilaterally.  Skin- warm and dry    Data Review:   Recent Labs     01/31/24  0424 02/01/24  0628    140   K 4.3 4.3   BUN 21 23   WBC 5.6 5.2   HGB 9.0* 9.3*   HCT 29.8* 31.0*    200       Assessment/Plan:     Principal Problem:    Chest pain  -Noncardiac; possibly contributed by elevated BPs on presentation.  -Prior coronary angiogram with no significant coronary disease from 2/2022.  Flat troponins.  No further cardiac workup needed at this point.    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  -Pre-TAVR coronary angiogram with normal coronaries from 2/2022      Essential hypertension  -Elevated BPs on presentation likely contributing to symptoms; noted on high-dose Coreg, hydralazine 100 mg 3 times daily, losartan 100 mg daily.  -Add on Aldactone 25 mg daily.  Maintain home dose Lasix 20 mg daily on discharge; check a BMP in a week post initiating Aldactone      Chronic kidney disease, stage III (moderate) (HCC)      COPD, mild (HCC)      Elevated troponin  -Likely demand related in the setting of accelerated hypertension.  Flat troponins not

## 2024-02-02 NOTE — CARE COORDINATION
Pt is for discharge home today with family and no needs/supportive care orders recieved for CM at this time. Pt declined  services.        02/02/24 1012   Service Assessment   Patient's Healthcare Decision Maker is: Named in Scanned ACP Document   Social/Functional History   Lives With Alone   Type of Home House   Home Layout Two level;Able to Live on Main level with bedroom/bathroom   Home Access Stairs to enter with rails   Entrance Stairs - Number of Steps 7   Entrance Stairs - Rails Right   Bathroom Shower/Tub Walk-in shower   Bathroom Toilet Standard   Bathroom Equipment Shower chair   Home Equipment Cane;Walker, rolling;Oxygen   Receives Help From Family   ADL Assistance Independent   Homemaking Assistance Independent   Homemaking Responsibilities Yes   Ambulation Assistance Independent   Transfer Assistance Independent   Active  Yes   Occupation Retired   Services At/After Discharge   Transition of Care Consult (CM Consult) Discharge Planning   Services At/After Discharge None   [place] Resource Information Provided? No   Mode of Transport at Discharge Other (see comment)  (family)   Confirm Follow Up Transport Family   Condition of Participation: Discharge Planning   The Plan for Transition of Care is related to the following treatment goals: Pt to return home with family.   The Patient and/or Patient Representative was provided with a Choice of Provider? Patient   The Patient and/Or Patient Representative agree with the Discharge Plan? Yes   Freedom of Choice list was provided with basic dialogue that supports the patient's individualized plan of care/goals, treatment preferences, and shares the quality data associated with the providers?  Yes

## 2024-02-09 NOTE — PROGRESS NOTES
Physician Progress Note      PATIENT:               SAVAGE CHIANG  [facility] #:                  [id number]  :                       1931  ADMIT DATE:       2024 5:43 PM  DISCH DATE:        2024 9:08 AM  RESPONDING  PROVIDER #:        Samantha Mayes MD          QUERY TEXT:    Patient admitted with chest pain.  Noted documentation of Acute Kidney Injury   in H&P  with Cr 1.37, 1.24.  In order to support the diagnosis of RUSS, please   include additional clinical indicators in your documentation.? Or please   document if the diagnosis of RUSS has been ruled out after further study.    The medical record reflects the following:  Risk Factors:  HTN,    Clinical Indicators:  H&P,  \" RUSS , Gentle IVF, follow BMP\".    Internal Medicine PN,  \"Baseline creatinine appears to be between   1.1-1.4, Creatinine currently at baseline, Continue to monitor renal function   and urine output with increase in Lasix dosing\".    Cr - 1.37, 1.24, 130  GFR - 36, 41, 39  BUN - 25, 21, 23    Treatment:  Serial labs, 0.9 % sodium chloride infusion, monitor renal   function and urine output  Options provided:  -- Acute kidney injury evidenced by, Please document evidence.  -- CKD stage 3A without RUSS  -- Other - I will add my own diagnosis  -- Disagree - Not applicable / Not valid  -- Disagree - Clinically unable to determine / Unknown  -- Refer to Clinical Documentation Reviewer    PROVIDER RESPONSE TEXT:    This Patient has CKD stage 3A without RUSS.    Query created by: ZAN MAY on 2024 11:05 AM      QUERY TEXT:    Patient admitted with Chest Pain, noted to have Paroxysmal atrial fibrillation   and is maintained on Eliquis. If possible, please document in progress notes   and discharge summary if you are evaluating and/or treating any of the   following:    The medical record reflects the following:    Risk Factors: HTN, CHF, 89 year old female    Clinical Indicators: IM PN,  \"pAFIB:Coreg,  Eliquis\"    Treatment: Eliquis  Options provided:  -- Secondary hypercoagulable state in a patient with atrial fibrillation  -- Other - I will add my own diagnosis  -- Disagree - Not applicable / Not valid  -- Disagree - Clinically unable to determine / Unknown  -- Refer to Clinical Documentation Reviewer    PROVIDER RESPONSE TEXT:    This patient has secondary hypercoagulable state in a patient with atrial   fibrillation.    Query created by: ZAN MAY on 2/2/2024 11:09 AM      Electronically signed by:  Samantha Mayes MD 2/9/2024 12:47 PM

## 2024-02-15 NOTE — PROGRESS NOTES
She is a very pleasant 89-year-old female, well-known to me with history of COPD, hypoxemia, grade 2 diastolic dysfunction, history of moderate pulmonary hypertension, now mild (RVSP estimated 38 mmHg on echo 7/2022) who is seen today for follow up.  She is accompanied today by her youngest daughter.          DIAGNOSTICS:          ECHO 2008.   Spirometry 8/06 - moderate obstructive defect.   Spirometry 2007, 2009, 2010.   CXR 2006, 2007, 2009, 2010 - no acute process.   CT of neck 3/10 - normal.   Bronchoscopy 3/10 - normal.   Spirometry 9/17/10 - normal but interval decline in FVC and FEV 1.   Spirometry 9/11 -- mild airway obstruction, there is mild interval decline in FEV1.   CXR 9/11 -- no acute abnormality.   PA and lateral CXR 10/22/12 -- mild hyperinflation, no air space disease, stable.   Spirometry 4/22/13 -- mild airway obstruction, interval decline.   Spirometry 11/5/14 - mild obstruction, no significant change.   CXR 11/5/14 - Stable cardiomegaly, mild hyperinflation. No significant interval change.   Spirometry 4/2015 - normal.   CXR 4/8/2015 - no acute abnormality, stable mild cardiomegaly.   Spirometry 8/10/2015 - normal.   ECHO 6/5/2017 demonstrated moderate left atrial dilatation, mild LVH, EF 50-55%, mild MR, moderate pulmonary hypertension with RVSP estimated at 49 mmHg.   Ambulatory oximetry 9/27/2017-baseline saturation room air at rest 92%, saturation 88% on room air with ambulation, saturation 96% on 2 L/min with ambulation.   Overnight oximetry 10/2017-adequate saturation on 3 L/min.   Spirometry 12/20/2017 - mild obstructive defect, interval improvement.   Spirometry 12/31/2018 - mild obstructive defect, interval decline, technically limited study.   Echo 1/7/2020- EF 55-65%, grade 1 diastolic dysfunction, severely dilated left atrium, mild-moderate AR, moderate MR and AS.  RVSP estimated at 45 mmHg.   Spirometry 1/22/2020- mild obstructive defect, slight improvement in FVC and FEV1.

## 2024-02-16 ENCOUNTER — OFFICE VISIT (OUTPATIENT)
Dept: PULMONOLOGY | Age: 89
End: 2024-02-16
Payer: MEDICARE

## 2024-02-16 VITALS
TEMPERATURE: 97.2 F | RESPIRATION RATE: 18 BRPM | DIASTOLIC BLOOD PRESSURE: 82 MMHG | WEIGHT: 162 LBS | BODY MASS INDEX: 29.81 KG/M2 | SYSTOLIC BLOOD PRESSURE: 140 MMHG | HEART RATE: 67 BPM | OXYGEN SATURATION: 98 % | HEIGHT: 62 IN

## 2024-02-16 DIAGNOSIS — I27.20 PULMONARY HYPERTENSION (HCC): ICD-10-CM

## 2024-02-16 DIAGNOSIS — R09.02 HYPOXIA: ICD-10-CM

## 2024-02-16 DIAGNOSIS — I51.89 GRADE II DIASTOLIC DYSFUNCTION: ICD-10-CM

## 2024-02-16 DIAGNOSIS — I34.0 MODERATE MITRAL REGURGITATION: ICD-10-CM

## 2024-02-16 DIAGNOSIS — I42.0 DILATED CARDIOMYOPATHY (HCC): ICD-10-CM

## 2024-02-16 DIAGNOSIS — Z95.2 S/P TAVR (TRANSCATHETER AORTIC VALVE REPLACEMENT): ICD-10-CM

## 2024-02-16 DIAGNOSIS — J44.9 COPD, MILD (HCC): Primary | ICD-10-CM

## 2024-02-16 LAB
EXPIRATORY TIME: NORMAL
FEF 25-75% %PRED-PRE: NORMAL
FEF 25-75% PRED: NORMAL
FEF 25-75-PRE: NORMAL
FEV1 %PRED-PRE: NORMAL %
FEV1 PRED: NORMAL
FEV1/FVC %PRED-PRE: NORMAL
FEV1/FVC PRED: NORMAL
FEV1/FVC: 0.6 %
FEV1: 0.73 L
FVC %PRED-PRE: NORMAL %
FVC PRED: NORMAL
FVC: 1.2 L
PEF %PRED-PRE: NORMAL
PEF PRED: NORMAL
PEF-PRE: NORMAL

## 2024-02-16 PROCEDURE — G8427 DOCREV CUR MEDS BY ELIG CLIN: HCPCS | Performed by: NURSE PRACTITIONER

## 2024-02-16 PROCEDURE — 99214 OFFICE O/P EST MOD 30 MIN: CPT | Performed by: NURSE PRACTITIONER

## 2024-02-16 PROCEDURE — 3023F SPIROM DOC REV: CPT | Performed by: NURSE PRACTITIONER

## 2024-02-16 PROCEDURE — 1036F TOBACCO NON-USER: CPT | Performed by: NURSE PRACTITIONER

## 2024-02-16 PROCEDURE — G8484 FLU IMMUNIZE NO ADMIN: HCPCS | Performed by: NURSE PRACTITIONER

## 2024-02-16 PROCEDURE — 1090F PRES/ABSN URINE INCON ASSESS: CPT | Performed by: NURSE PRACTITIONER

## 2024-02-16 PROCEDURE — 1111F DSCHRG MED/CURRENT MED MERGE: CPT | Performed by: NURSE PRACTITIONER

## 2024-02-16 PROCEDURE — 1123F ACP DISCUSS/DSCN MKR DOCD: CPT | Performed by: NURSE PRACTITIONER

## 2024-02-16 PROCEDURE — G8417 CALC BMI ABV UP PARAM F/U: HCPCS | Performed by: NURSE PRACTITIONER

## 2024-02-16 ASSESSMENT — PULMONARY FUNCTION TESTS
FEV1/FVC: 0.60
FEV1: 0.73
FVC: 1.20

## 2024-02-16 NOTE — PROGRESS NOTES
[facility]  [address] , [address]  [place] [address]  [phone]    Patient Name:  Yolanda Young    YOB: 1931    Office Visit 2/16/2024      CHIEF COMPLAINT:      Chief Complaint   Patient presents with    COPD         ASSESSMENT:   (Medical Decision Making)                                                                                                                                          Encounter Diagnoses   Name Primary?    COPD, mild (HCC) Yes    Hypoxia     Pulmonary hypertension (HCC)     Dilated cardiomyopathy (HCC)     Grade II diastolic dysfunction     S/P TAVR (transcatheter aortic valve replacement)     Moderate mitral regurgitation      She is stable symptomatically.  There has been some decline in FVC but no significant change in FEV1, she has not taken her nebulizer therapy today (which serves as her maintenance regimen.  Lungs are clear.    She is compliant with supplemental oxygen.    Mild pulmonary hypertension noted on recent echo.    She is up-to-date on flu, COVID and pneumonia vaccines.  Discussed RSV vaccine with her and her daughter.                      PLAN:     Continue albuterol/ipratropium in (duoneb) in nebulizer 2 - 4 times daily.     Continue budesonide in nebulizer twice daily, rinse mouth after use.     May use albuterol inhaler 2 puffs twice daily in addition to nebulizer if needed, OR up to 4 times daily when away from home/nebulizer.     Continue oxygen with exertion and sleep as prescribed.    She is up to date on flu, COVID, and pneumonia vaccinees.      Recommend RSV vaccine.      Orders Placed This Encounter   Procedures    Spirometry Without Bronchodilator     Follow-up and Dispositions    Return in about 6 months (around 8/12/2024) for MD denilson Webber, COPD, hypoxia.       She has refills on respiratory Rx to last through August.        CAT TAVARES, APRN - CNP    Total  time spent with patient - 32 min.

## 2024-02-16 NOTE — PATIENT INSTRUCTIONS
Continue albuterol/ipratropium in (duoneb) in nebulizer 2 - 4 times daily.     Continue budesonide in nebulizer twice daily, rinse mouth after use.     May use albuterol inhaler 2 puffs twice daily in addition to nebulizer if needed, OR up to 4 times daily when away from home/nebulizer.     Continue oxygen with exertion and sleep as prescribed.    She is up to date on flu, COVID, and pneumonia vaccinees.      Recommend RSV vaccine.

## 2024-02-23 NOTE — PROGRESS NOTES
Physician Progress Note      PATIENT:               SAVAGE CHIANG  CSN #:                  [id number]  :                       1931  ADMIT DATE:       2024 5:43 PM  DISCH DATE:        2024 9:08 AM  RESPONDING  PROVIDER #:        Samantha Mayes MD          QUERY TEXT:    Patient presented with chest pain. On arrival hypertensive with B/P 164/101   treated with adding 4th antihypertensive to current HTN regimen and cardiology   consult. After chest pain work up cardiology notes, elevated BP's on   presentation likely contributing to symptoms. Add on Aldactone 25mg daily.   GERD treated with continuing home medication Pepcid. Per discharge summary   \"Seen by cardiology who felt chest pain was more muscular or GERD related.   This has since resolved\" After study can the likely etiology of the patient's   chest pain be further clarified as:      The medical record reflects the following:  Risk Factors: 89 yr, HTn, PPM, CHF, Afib,    Clinical Indicators: arrival  B/P 164/10, per cardiology notes, \"elevated BP's   on presentation likely contributing to symptoms.\" per d/c summary\" Seen by   cardiology who felt chest pain was more muscular or GERD related.\"    Treatment: cardiology consult, continued home medication Pepcid, discharged on   Coreg and Aldactone        [email or web address]  Options provided:  -- Chest pain due to hypertension  -- Chest pain due GERD  -- Chest pain due to hypertension and GERD  -- Chest pain due to, Please specify cause.  -- Other - I will add my own diagnosis  -- Disagree - Not applicable / Not valid  -- Disagree - Clinically unable to determine / Unknown  -- Refer to Clinical Documentation Reviewer    PROVIDER RESPONSE TEXT:    This patient has chest pain due to muscular and reflux    Query created by: ANNETTA MAY on 2024 10:51 AM      Electronically signed by:  Samantha Mayes MD 2024 7:21 PM

## 2024-03-01 PROBLEM — R79.89 ELEVATED TROPONIN: Status: RESOLVED | Noted: 2024-01-31 | Resolved: 2024-03-01

## 2024-05-03 ENCOUNTER — PATIENT MESSAGE (OUTPATIENT)
Dept: PULMONOLOGY | Age: 89
End: 2024-05-03

## 2024-05-03 NOTE — TELEPHONE ENCOUNTER
From: Yolanda Young  To: Zulma Alarcon  Sent: 5/3/2024 10:15 AM EDT  Subject: Medication     Good morning. My mother has an appointment scheduled for August 16. While in the hospital she was prescribed Spironolactone 25 mg tablet and was instructed to take 1 time per day. She has 5 pills left and no refills. Do I need to schedule her an appointment to have this medication refilled or should she stop taking it?   Thank you,   Yajaira David

## 2024-07-17 NOTE — PROGRESS NOTES
[facility]  [address]  [place] [address]  [phone]    Patient Name:  Yolanda Young    YOB: 1931    Office Visit 7/18/2024      CHIEF COMPLAINT:      Chief Complaint   Patient presents with    Follow-up         ASSESSMENT:   (Medical Decision Making)                                                                                                                                          Encounter Diagnoses   Name Primary?    COPD, mild (HCC) Yes    Hypoxia     Pulmonary hypertension (HCC)     Dilated cardiomyopathy (HCC)     Grade II diastolic dysfunction     Moderate mitral regurgitation     Diastolic dysfunction      She is stable symptomatically, compliant with therapy for airways disease.  Historically, obstruction has been mild.  Most recent spirometry demonstrated moderately severe obstruction.    Current oxygen concentrator is malfunctioning, they have contacted DME company, have changed filter but no improvement.  At this point, patient wishes to change to DME company.    ECHO 1/31/2024-EF 55-60%, moderate LVH, abnormal diastolic function, bioprosthetic aortic valve, mild MR, mild TR, RVSP estimated 41 mmHg consistent with mild pulmonary hypertension.  She is stable symptomatically.                      PLAN:     Will obtain overnight oximetry on room air to requalify for oxygen with [facility].      I discussed requalification process with her.  She needs to contact us if she has not heard from them to perform the study within 2 weeks and also to contact us if she has not heard results from us after 1 week of study.    For now, we will keep follow-up appointment in August, to ensure that above is accomplished.    May use albuterol inhaler 2 puffs twice daily in addition to nebulizer if needed, OR up to 4 times daily when away from home/nebulizer.    Orders Placed This Encounter   Procedures    Pulse oximetry, overnight     Scheduling

## 2024-07-18 ENCOUNTER — OFFICE VISIT (OUTPATIENT)
Dept: PULMONOLOGY | Age: 89
End: 2024-07-18
Payer: MEDICARE

## 2024-07-18 VITALS
HEIGHT: 59 IN | HEART RATE: 91 BPM | OXYGEN SATURATION: 98 % | SYSTOLIC BLOOD PRESSURE: 150 MMHG | WEIGHT: 162 LBS | BODY MASS INDEX: 32.66 KG/M2 | RESPIRATION RATE: 18 BRPM | DIASTOLIC BLOOD PRESSURE: 90 MMHG | TEMPERATURE: 97.2 F

## 2024-07-18 DIAGNOSIS — I27.20 PULMONARY HYPERTENSION (HCC): ICD-10-CM

## 2024-07-18 DIAGNOSIS — I34.0 MODERATE MITRAL REGURGITATION: ICD-10-CM

## 2024-07-18 DIAGNOSIS — I51.89 GRADE II DIASTOLIC DYSFUNCTION: ICD-10-CM

## 2024-07-18 DIAGNOSIS — J44.9 COPD, MILD (HCC): Primary | ICD-10-CM

## 2024-07-18 DIAGNOSIS — I51.89 DIASTOLIC DYSFUNCTION: ICD-10-CM

## 2024-07-18 DIAGNOSIS — I42.0 DILATED CARDIOMYOPATHY (HCC): ICD-10-CM

## 2024-07-18 DIAGNOSIS — R09.02 HYPOXIA: ICD-10-CM

## 2024-07-18 PROCEDURE — 3023F SPIROM DOC REV: CPT | Performed by: NURSE PRACTITIONER

## 2024-07-18 PROCEDURE — 99214 OFFICE O/P EST MOD 30 MIN: CPT | Performed by: NURSE PRACTITIONER

## 2024-07-18 PROCEDURE — 1123F ACP DISCUSS/DSCN MKR DOCD: CPT | Performed by: NURSE PRACTITIONER

## 2024-07-18 PROCEDURE — G8417 CALC BMI ABV UP PARAM F/U: HCPCS | Performed by: NURSE PRACTITIONER

## 2024-07-18 PROCEDURE — 1036F TOBACCO NON-USER: CPT | Performed by: NURSE PRACTITIONER

## 2024-07-18 PROCEDURE — G8427 DOCREV CUR MEDS BY ELIG CLIN: HCPCS | Performed by: NURSE PRACTITIONER

## 2024-07-18 PROCEDURE — 1090F PRES/ABSN URINE INCON ASSESS: CPT | Performed by: NURSE PRACTITIONER

## 2024-07-18 RX ORDER — ACETAMINOPHEN 500 MG
500 TABLET ORAL EVERY 6 HOURS PRN
COMMUNITY

## 2024-07-18 ASSESSMENT — ENCOUNTER SYMPTOMS
SPUTUM PRODUCTION: 0
SHORTNESS OF BREATH: 1
COUGH: 0
WHEEZING: 0

## 2024-07-18 NOTE — PATIENT INSTRUCTIONS
Will obtain overnight oximetry on room air to requalify for oxygen with [facility].      I discussed requalification process with her.  She needs to contact us if she has not heard from them to perform the study within 2 weeks and also to contact us if she has not heard results from us after 1 week of study.    For now, we will keep follow-up appointment in August, to ensure that above is accomplished.    May use albuterol inhaler 2 puffs twice daily in addition to nebulizer if needed, OR up to 4 times daily when away from home/nebulizer.  .

## 2024-08-09 ENCOUNTER — TELEPHONE (OUTPATIENT)
Dept: PULMONOLOGY | Age: 89
End: 2024-08-09

## 2024-08-09 DIAGNOSIS — J44.9 COPD, MILD (HCC): Primary | ICD-10-CM

## 2024-08-09 DIAGNOSIS — R09.02 HYPOXIA: ICD-10-CM

## 2024-08-09 NOTE — TELEPHONE ENCOUNTER
Patient is asking about when she is going to get her new oxygen concentrator from [facility].  She has not heard anything from [facility]

## 2024-08-14 NOTE — PROGRESS NOTES
[facility]  [address] , [address]  [place] [address]  [phone]    Patient Name:  Yolanda Young    YOB: 1931    Office Visit 8/16/2024      CHIEF COMPLAINT:      Chief Complaint   Patient presents with    Follow-up    COPD    Hypoxia         ASSESSMENT:   (Medical Decision Making)                                                                                                                                          Encounter Diagnoses   Name Primary?    Moderate COPD (chronic obstructive pulmonary disease) (HCC) Yes    Hypoxia     Pulmonary hypertension (HCC)     Dilated cardiomyopathy (HCC)     Grade II diastolic dysfunction     Moderate mitral regurgitation     S/P TAVR (transcatheter aortic valve replacement)      She is fairly stable symptomatically, although he is having issues with morning cough and some difficulty clearing secretions.  Suboptimal compliance with nebulizer Rx which serves as her maintenance regimen.    She is compliant with nocturnal oxygen and has new concentrator through [employer].    DCM, diastolic dysfunction, MR appears compensated at this time.  Her daughter indicates that she is no longer taking spironolactone and using Lasix as needed, as recommended by other provider.                      PLAN:     Continue ipratropium/albuterol via nebulizer 4 times daily, add budesonide twice daily, gargle with water after use.    May use albuterol inhaler when away from home/nebulizer, 2 puffs up to 4 times daily.    OTC mucolytic, (mucinex or generic equivalent of guaifenesin), 2400mg in 24 hours in divided doses as needed to thin mucous.    Continue oxygen with sleep as prescribed.    Recommend newest COVID booster when available, seasonal flu vaccine and RSV vaccine.  Advised to check if insurance will reimburse for Prevnar 20 pneumonia vaccine to give added protection.  Otherwise, she is technically up-to-date on Prevnar 13 and

## 2024-08-16 ENCOUNTER — OFFICE VISIT (OUTPATIENT)
Dept: PULMONOLOGY | Age: 89
End: 2024-08-16
Payer: MEDICARE

## 2024-08-16 VITALS
BODY MASS INDEX: 29.44 KG/M2 | DIASTOLIC BLOOD PRESSURE: 84 MMHG | HEART RATE: 78 BPM | WEIGHT: 160 LBS | TEMPERATURE: 97.2 F | HEIGHT: 62 IN | SYSTOLIC BLOOD PRESSURE: 186 MMHG | RESPIRATION RATE: 19 BRPM | OXYGEN SATURATION: 100 %

## 2024-08-16 DIAGNOSIS — I27.20 PULMONARY HYPERTENSION (HCC): ICD-10-CM

## 2024-08-16 DIAGNOSIS — J44.9 COPD, MILD (HCC): Chronic | ICD-10-CM

## 2024-08-16 DIAGNOSIS — I51.89 GRADE II DIASTOLIC DYSFUNCTION: ICD-10-CM

## 2024-08-16 DIAGNOSIS — I34.0 MODERATE MITRAL REGURGITATION: ICD-10-CM

## 2024-08-16 DIAGNOSIS — R09.02 HYPOXIA: ICD-10-CM

## 2024-08-16 DIAGNOSIS — J44.9 MODERATE COPD (CHRONIC OBSTRUCTIVE PULMONARY DISEASE) (HCC): Primary | ICD-10-CM

## 2024-08-16 DIAGNOSIS — Z95.2 S/P TAVR (TRANSCATHETER AORTIC VALVE REPLACEMENT): ICD-10-CM

## 2024-08-16 DIAGNOSIS — I42.0 DILATED CARDIOMYOPATHY (HCC): ICD-10-CM

## 2024-08-16 PROCEDURE — 3023F SPIROM DOC REV: CPT | Performed by: NURSE PRACTITIONER

## 2024-08-16 PROCEDURE — 99214 OFFICE O/P EST MOD 30 MIN: CPT | Performed by: NURSE PRACTITIONER

## 2024-08-16 PROCEDURE — 1036F TOBACCO NON-USER: CPT | Performed by: NURSE PRACTITIONER

## 2024-08-16 PROCEDURE — 1123F ACP DISCUSS/DSCN MKR DOCD: CPT | Performed by: NURSE PRACTITIONER

## 2024-08-16 PROCEDURE — G8417 CALC BMI ABV UP PARAM F/U: HCPCS | Performed by: NURSE PRACTITIONER

## 2024-08-16 PROCEDURE — 1090F PRES/ABSN URINE INCON ASSESS: CPT | Performed by: NURSE PRACTITIONER

## 2024-08-16 PROCEDURE — G8427 DOCREV CUR MEDS BY ELIG CLIN: HCPCS | Performed by: NURSE PRACTITIONER

## 2024-08-16 RX ORDER — BUDESONIDE 0.5 MG/2ML
INHALANT ORAL
Qty: 180 EACH | Refills: 3 | Status: SHIPPED | OUTPATIENT
Start: 2024-08-16

## 2024-08-16 RX ORDER — ALBUTEROL SULFATE 90 UG/1
AEROSOL, METERED RESPIRATORY (INHALATION)
Qty: 3 EACH | Refills: 3 | Status: SHIPPED | OUTPATIENT
Start: 2024-08-16

## 2024-08-16 RX ORDER — IPRATROPIUM BROMIDE AND ALBUTEROL SULFATE 2.5; .5 MG/3ML; MG/3ML
1 SOLUTION RESPIRATORY (INHALATION) 4 TIMES DAILY
Qty: 1080 ML | Refills: 3 | Status: SHIPPED | OUTPATIENT
Start: 2024-08-16

## 2024-08-16 ASSESSMENT — ENCOUNTER SYMPTOMS
COUGH: 1
SPUTUM PRODUCTION: 1
WHEEZING: 0
HEMOPTYSIS: 0
SHORTNESS OF BREATH: 1

## 2024-08-16 NOTE — PATIENT INSTRUCTIONS
Continue ipratropium/albuterol via nebulizer 4 times daily, add budesonide twice daily, gargle with water after use.    May use albuterol inhaler when away from home/nebulizer, 2 puffs up to 4 times daily.    OTC mucolytic, (mucinex or generic equivalent of guaifenesin), 2400mg in 24 hours in divided doses as needed to thin mucous.    Continue oxygen with sleep as prescribed.    Recommend newest COVID booster when available, seasonal flu vaccine and RSV vaccine.  Advised to check if insurance will reimburse for Prevnar 20 pneumonia vaccine to give added protection.  Otherwise, she is technically up-to-date on Prevnar 13 and PPSV23.    Follow-up in 6 months with spirometry, sooner if needed.

## 2025-01-07 DIAGNOSIS — J44.9 COPD, MILD (HCC): Chronic | ICD-10-CM

## 2025-01-07 RX ORDER — IPRATROPIUM BROMIDE AND ALBUTEROL SULFATE 2.5; .5 MG/3ML; MG/3ML
1 SOLUTION RESPIRATORY (INHALATION) 4 TIMES DAILY
Qty: 360 EACH | Refills: 11 | Status: SHIPPED | OUTPATIENT
Start: 2025-01-07

## 2025-01-07 RX ORDER — BUDESONIDE 0.5 MG/2ML
INHALANT ORAL
Qty: 60 EACH | Refills: 11 | Status: SHIPPED | OUTPATIENT
Start: 2025-01-07

## 2025-01-07 RX ORDER — BUDESONIDE 0.5 MG/2ML
INHALANT ORAL
Qty: 180 EACH | Refills: 3 | Status: SHIPPED | OUTPATIENT
Start: 2025-01-07 | End: 2025-01-07

## 2025-01-07 RX ORDER — IPRATROPIUM BROMIDE AND ALBUTEROL SULFATE 2.5; .5 MG/3ML; MG/3ML
1 SOLUTION RESPIRATORY (INHALATION) 4 TIMES DAILY
Qty: 1080 ML | Refills: 3 | Status: SHIPPED | OUTPATIENT
Start: 2025-01-07 | End: 2025-01-07

## 2025-02-17 NOTE — PROGRESS NOTES
gradients aortic valve.  Mild MR, mild TR, RVSP estimated at 38 mm.  Spirometry 8/9/2022-mild obstructive defect, no significant change.  Ambulatory oximetry 12/9/2022-baseline saturation 95% room air at rest, 91% room air with ambulation.  Repeat ambulatory oximetry demonstrated lowest saturation 93%.  Baseline heart rate 90, maximum 118 with ambulation.  JUSTIN 1/12/2023 -for requalification- O2 saturation was 88% or below for 15.7 minutes, lowest saturation 77%  ECHO 1/31/2024-EF 55-60%, moderate LVH, abnormal diastolic function, bioprosthetic aortic valve, mild MR, mild TR, RVSP estimated 41 mmHg consistent with mild pulmonary hypertension.  CXR 1/30/2024-clear lungs, no acute findings in chest.  Spirometry 2/16/2024-moderately severe obstructive defect with decreased FVC.  Interval decline in FVC, no significant change in FEV1.  Ambulatory oximetry 7/18/2024-baseline saturation 99% room air at rest, 97% room air with ambulation.  Baseline heart rate 74, maximum 84 with ambulation.  Overnight oximetry 7/31/2024 demonstrated desaturation 88% or below for 16 minutes with lowest saturation 81%.  _____________________________________________________________      REVIEW OF SYSTEMS:    Review of Systems   Constitutional: Negative for chills, fever and weight loss.   Cardiovascular:  Negative for chest pain and leg swelling.   Respiratory:  Positive for shortness of breath. Negative for cough and sputum production.         Occasional wheezing, relieved with nebulizer.          PHYSICAL EXAM:    Vitals:    02/18/25 1041   BP: (!) 146/90   Pulse: 73   Resp: 14   Temp: 97.3 °F (36.3 °C)   TempSrc: Infrared   SpO2: 94%   Weight: 73.1 kg (161 lb 1.6 oz)   Height: 1.6 m (5' 3\")        Body mass index is 28.54 kg/m².    GENERAL APPEARANCE:  The patient is normal weight and in no respiratory distress.    HEENT:  PERRL.  Conjunctivae unremarkable.    NECK/LYMPHATIC:   Symmetrical with no elevation of jugular venous pulsation.

## 2025-02-18 ENCOUNTER — OFFICE VISIT (OUTPATIENT)
Dept: PULMONOLOGY | Age: 89
End: 2025-02-18
Payer: MEDICARE

## 2025-02-18 VITALS
DIASTOLIC BLOOD PRESSURE: 90 MMHG | RESPIRATION RATE: 14 BRPM | HEIGHT: 63 IN | WEIGHT: 161.1 LBS | TEMPERATURE: 97.3 F | BODY MASS INDEX: 28.54 KG/M2 | SYSTOLIC BLOOD PRESSURE: 146 MMHG | HEART RATE: 73 BPM | OXYGEN SATURATION: 94 %

## 2025-02-18 DIAGNOSIS — I51.89 GRADE II DIASTOLIC DYSFUNCTION: Chronic | ICD-10-CM

## 2025-02-18 DIAGNOSIS — I27.20 PULMONARY HYPERTENSION (HCC): Chronic | ICD-10-CM

## 2025-02-18 DIAGNOSIS — R09.02 HYPOXIA: Chronic | ICD-10-CM

## 2025-02-18 DIAGNOSIS — J44.9 MODERATE COPD (CHRONIC OBSTRUCTIVE PULMONARY DISEASE) (HCC): Primary | Chronic | ICD-10-CM

## 2025-02-18 DIAGNOSIS — I42.0 DILATED CARDIOMYOPATHY (HCC): Chronic | ICD-10-CM

## 2025-02-18 PROCEDURE — 1159F MED LIST DOCD IN RCRD: CPT | Performed by: NURSE PRACTITIONER

## 2025-02-18 PROCEDURE — 3023F SPIROM DOC REV: CPT | Performed by: NURSE PRACTITIONER

## 2025-02-18 PROCEDURE — 99214 OFFICE O/P EST MOD 30 MIN: CPT | Performed by: NURSE PRACTITIONER

## 2025-02-18 PROCEDURE — 1036F TOBACCO NON-USER: CPT | Performed by: NURSE PRACTITIONER

## 2025-02-18 PROCEDURE — 1090F PRES/ABSN URINE INCON ASSESS: CPT | Performed by: NURSE PRACTITIONER

## 2025-02-18 PROCEDURE — G8427 DOCREV CUR MEDS BY ELIG CLIN: HCPCS | Performed by: NURSE PRACTITIONER

## 2025-02-18 PROCEDURE — 1160F RVW MEDS BY RX/DR IN RCRD: CPT | Performed by: NURSE PRACTITIONER

## 2025-02-18 PROCEDURE — 1123F ACP DISCUSS/DSCN MKR DOCD: CPT | Performed by: NURSE PRACTITIONER

## 2025-02-18 PROCEDURE — 1126F AMNT PAIN NOTED NONE PRSNT: CPT | Performed by: NURSE PRACTITIONER

## 2025-02-18 PROCEDURE — G8417 CALC BMI ABV UP PARAM F/U: HCPCS | Performed by: NURSE PRACTITIONER

## 2025-02-18 ASSESSMENT — ENCOUNTER SYMPTOMS
SHORTNESS OF BREATH: 1
COUGH: 0
SPUTUM PRODUCTION: 0

## 2025-02-18 NOTE — PATIENT INSTRUCTIONS
Continue ipratropium/albuterol via nebulizer 4 times daily, add budesonide twice daily, gargle with water after use.     May use albuterol inhaler when away from home/nebulizer, 2 puffs up to 4 times daily.     OTC mucolytic, (mucinex or generic equivalent of guaifenesin), 2400mg in 24 hours in divided doses as needed to thin mucous.     Continue oxygen with exertion to keep saturation > 90%, and continue with sleep as prescribed.      Follow-up in 6 months with spirometry, sooner if needed.

## 2025-04-02 ENCOUNTER — TELEPHONE (OUTPATIENT)
Dept: PULMONOLOGY | Age: 89
End: 2025-04-02

## 2025-04-02 NOTE — TELEPHONE ENCOUNTER
Patients granddaughter Ashley says that the patient was at [facility] yesterday . She was given 10 days  prednisone and a couple treatments and was told to contact her pulmonary provider asasher.Hayley is printing the notes from yesterdays visit and faxing it to triage .

## 2025-04-02 NOTE — TELEPHONE ENCOUNTER
Last seen: 2/18/25  Hx: COPD, PAH, cardiomyopathy    Patient was seen & treated in ER yesterday for COPD exacerbation, granddaughter is calling to f/u as instructed.   Was prescribed 10 day course of steroids.     Nora is listed on [facility].   Called back to review next steps. Confirmed patient should be starting steroids today, agrees that f/u appt at end of course is best option, scheduled in office 4/11 w/ NP.    Imaging requested to be pushed to PACs.

## 2025-04-10 NOTE — PROGRESS NOTES
goal blood pressure less than 140/90    Cervical dystonia    Nonrheumatic aortic (valve) stenosis    GERD (gastroesophageal reflux disease)    Iron deficiency anemia    Pacemaker    Chronic kidney disease, stage III (moderate) (HCC)    Moderate COPD (chronic obstructive pulmonary disease) (HCC)    Aortic valve regurgitation    Pulmonary hypertension (HCC)    S/P TAVR (transcatheter aortic valve replacement)    Diastolic dysfunction    Chest pain    RUSS (acute kidney injury)    Dyspnea    Grade II diastolic dysfunction       Past Surgical History:   Procedure Laterality Date    CARDIAC PACEMAKER PLACEMENT      COLONOSCOPY      dr mahmood    HYSTERECTOMY (CERVIX STATUS UNKNOWN)      ORTHOPEDIC SURGERY      Left knee replacement    ORTHOPEDIC SURGERY      Right foot surgery    ORTHOPEDIC SURGERY      Right knee replacement    ORTHOPEDIC SURGERY  2009    Left Total Knee    PACEMAKER  ,     [place] UNLISTED PROCEDURE CARDIAC SURGERY           Social History     Socioeconomic History    Marital status:      Spouse name: None    Number of children: None    Years of education: None    Highest education level: None   Tobacco Use    Smoking status: Former     Current packs/day: 0.00     Average packs/day: 0.5 packs/day for 15.0 years (7.5 ttl pk-yrs)     Types: Cigarettes     Start date: 1945     Quit date: 1960     Years since quittin.3    Smokeless tobacco: Never    Tobacco comments:     Quit smoking: she attempt to smoke x 1 yr, did not smoke 1 total of 1 pk   Vaping Use    Vaping status: Never Used   Substance and Sexual Activity    Alcohol use: No    Drug use: No   Social History Narrative    She denies ETOH use.  She has a 15 pack year history of tobacco smoking but quit in .  There is no history of toxic industrial or TB exposure.  She is  and resides alone.     Social Drivers of Health     Financial Resource Strain: Low Risk  (3/12/2025)    Received from [employer]

## 2025-04-11 ENCOUNTER — OFFICE VISIT (OUTPATIENT)
Dept: PULMONOLOGY | Age: 89
End: 2025-04-11
Payer: MEDICARE

## 2025-04-11 VITALS
HEIGHT: 59 IN | DIASTOLIC BLOOD PRESSURE: 74 MMHG | RESPIRATION RATE: 18 BRPM | BODY MASS INDEX: 33.06 KG/M2 | OXYGEN SATURATION: 97 % | WEIGHT: 164 LBS | SYSTOLIC BLOOD PRESSURE: 122 MMHG | HEART RATE: 69 BPM | TEMPERATURE: 97.7 F

## 2025-04-11 DIAGNOSIS — J44.9 MODERATE COPD (CHRONIC OBSTRUCTIVE PULMONARY DISEASE) (HCC): ICD-10-CM

## 2025-04-11 DIAGNOSIS — Z09 HOSPITAL DISCHARGE FOLLOW-UP: Primary | ICD-10-CM

## 2025-04-11 DIAGNOSIS — I27.20 MILD PULMONARY HYPERTENSION (HCC): ICD-10-CM

## 2025-04-11 DIAGNOSIS — R09.02 HYPOXIA: ICD-10-CM

## 2025-04-11 PROCEDURE — 1123F ACP DISCUSS/DSCN MKR DOCD: CPT | Performed by: NURSE PRACTITIONER

## 2025-04-11 PROCEDURE — 1159F MED LIST DOCD IN RCRD: CPT | Performed by: NURSE PRACTITIONER

## 2025-04-11 PROCEDURE — G8417 CALC BMI ABV UP PARAM F/U: HCPCS | Performed by: NURSE PRACTITIONER

## 2025-04-11 PROCEDURE — 1126F AMNT PAIN NOTED NONE PRSNT: CPT | Performed by: NURSE PRACTITIONER

## 2025-04-11 PROCEDURE — 1090F PRES/ABSN URINE INCON ASSESS: CPT | Performed by: NURSE PRACTITIONER

## 2025-04-11 PROCEDURE — 1036F TOBACCO NON-USER: CPT | Performed by: NURSE PRACTITIONER

## 2025-04-11 PROCEDURE — 3023F SPIROM DOC REV: CPT | Performed by: NURSE PRACTITIONER

## 2025-04-11 PROCEDURE — 99214 OFFICE O/P EST MOD 30 MIN: CPT | Performed by: NURSE PRACTITIONER

## 2025-04-11 PROCEDURE — G8427 DOCREV CUR MEDS BY ELIG CLIN: HCPCS | Performed by: NURSE PRACTITIONER

## 2025-04-11 PROCEDURE — 1160F RVW MEDS BY RX/DR IN RCRD: CPT | Performed by: NURSE PRACTITIONER

## 2025-04-11 RX ORDER — ALLOPURINOL 300 MG/1
TABLET ORAL
COMMUNITY

## 2025-04-11 RX ORDER — MIRABEGRON 25 MG/1
25 TABLET, FILM COATED, EXTENDED RELEASE ORAL DAILY
COMMUNITY
Start: 2025-04-08

## 2025-04-11 RX ORDER — CLONIDINE 0.1 MG/24H
PATCH, EXTENDED RELEASE TRANSDERMAL
COMMUNITY

## 2025-04-11 RX ORDER — TRAMADOL HYDROCHLORIDE 50 MG/1
TABLET ORAL
COMMUNITY

## 2025-04-11 ASSESSMENT — ENCOUNTER SYMPTOMS
SHORTNESS OF BREATH: 1
HEMOPTYSIS: 0
COUGH: 0
SPUTUM PRODUCTION: 0
WHEEZING: 0

## 2025-04-11 NOTE — PATIENT INSTRUCTIONS
Use DuoNeb via nebulizer minimum of 3 times daily, up to 4 times daily.    Add budesonide to nebulizer twice daily, gargle with water after use.    Use albuterol inhaler 2 puffs up to 4 times daily if away from home and unable to use nebulizer.    OTC mucolytic, (mucinex or generic equivalent of guaifenesin), 2400mg in 24 hours in divided doses as needed to thin mucous.    Oxygen with sleep as prescribed.    Portable oxygen concentrator at 2 L/min with exertion with portable concentrator, adjust flow rate to keep saturation greater than 90%.    Advised to contact [employer] to determine if there is lighter weight portable system, or if rolling bag with extendable handles is available.    Alternative is to purchase [employer] portable concentrator if they can determine if there is lighter way system.    Contact # for [employer] is [phone];  ask for representative, Justen.    She will keep follow-up appointment in August as scheduled, sooner if needed.

## 2025-04-17 ENCOUNTER — PATIENT MESSAGE (OUTPATIENT)
Dept: PULMONOLOGY | Age: 89
End: 2025-04-17

## 2025-04-21 NOTE — TELEPHONE ENCOUNTER
At her recent visit, she reported that she was using ipratropium/albuterol (DuoNeb) on average of 2-3 times daily and using budesonide intermittently.    She does not need to use the nebulizer 6 times daily.  She can mix budesonide and DuoNeb together twice daily and use DuoNeb alone midday and, if needed, at bedtime.    To clarify, she can use DuoNeb 3 times daily and budesonide twice daily, but I recommend that she increase DuoNeb to 4 times daily if she is experiencing worsening shortness of breath, wheezing, worsening cough/chest congestion and difficulty clearing secretions.    If she is away from home, she can use albuterol inhaler in place of nebulizer.    One of the reasons we have chosen nebulizer therapy is due to cost of maintenance inhalers (currently, her respiratory medications are Bill to Medicare part B, rather than drug plan).  Also, as one ages, it becomes more challenging to use maintenance inhalers which are effort dependent, not meaning her willingness with effort, but lung excursion.    Let me know if there are ongoing issues.

## 2025-08-19 ENCOUNTER — OFFICE VISIT (OUTPATIENT)
Dept: PULMONOLOGY | Age: 89
End: 2025-08-19

## 2025-08-19 VITALS
DIASTOLIC BLOOD PRESSURE: 80 MMHG | HEART RATE: 75 BPM | TEMPERATURE: 98 F | HEIGHT: 60 IN | SYSTOLIC BLOOD PRESSURE: 124 MMHG | OXYGEN SATURATION: 97 % | WEIGHT: 163 LBS | BODY MASS INDEX: 32 KG/M2 | RESPIRATION RATE: 20 BRPM

## 2025-08-19 DIAGNOSIS — I42.0 DILATED CARDIOMYOPATHY (HCC): ICD-10-CM

## 2025-08-19 DIAGNOSIS — J44.9 MODERATE COPD (CHRONIC OBSTRUCTIVE PULMONARY DISEASE) (HCC): Primary | ICD-10-CM

## 2025-08-19 DIAGNOSIS — R09.02 HYPOXIA: ICD-10-CM

## 2025-08-19 DIAGNOSIS — I27.20 MILD PULMONARY HYPERTENSION (HCC): ICD-10-CM

## 2025-08-19 DIAGNOSIS — I51.89 GRADE II DIASTOLIC DYSFUNCTION: ICD-10-CM

## 2025-08-19 DIAGNOSIS — Z95.2 S/P TAVR (TRANSCATHETER AORTIC VALVE REPLACEMENT): ICD-10-CM

## 2025-08-19 LAB
EXPIRATORY TIME: NORMAL
FEF 25-75% %PRED-PRE: NORMAL
FEF 25-75% PRED: NORMAL
FEF 25-75-PRE: NORMAL
FEV1 %PRED-PRE: 70 %
FEV1 PRED: NORMAL
FEV1/FVC %PRED-PRE: NORMAL
FEV1/FVC PRED: NORMAL
FEV1/FVC: 57 %
FEV1: 0.86 L
FVC %PRED-PRE: 92 %
FVC PRED: NORMAL
FVC: 1.49 L
PEF %PRED-PRE: NORMAL
PEF PRED: NORMAL
PEF-PRE: NORMAL

## 2025-08-19 RX ORDER — ALBUTEROL SULFATE 90 UG/1
INHALANT RESPIRATORY (INHALATION)
Qty: 3 EACH | Refills: 3 | Status: SHIPPED | OUTPATIENT
Start: 2025-08-19

## 2025-08-19 RX ORDER — BUDESONIDE 0.5 MG/2ML
INHALANT ORAL
Qty: 60 EACH | Refills: 11 | Status: SHIPPED | OUTPATIENT
Start: 2025-08-19

## 2025-08-19 RX ORDER — IPRATROPIUM BROMIDE AND ALBUTEROL SULFATE 2.5; .5 MG/3ML; MG/3ML
1 SOLUTION RESPIRATORY (INHALATION) 4 TIMES DAILY
Qty: 360 EACH | Refills: 11 | Status: SHIPPED | OUTPATIENT
Start: 2025-08-19

## 2025-08-19 ASSESSMENT — ENCOUNTER SYMPTOMS
COUGH: 0
WHEEZING: 0
SHORTNESS OF BREATH: 0

## 2025-08-19 ASSESSMENT — PULMONARY FUNCTION TESTS
FVC: 1.49
FEV1_PERCENT_PREDICTED_PRE: 70
FEV1: 0.86
FVC_PERCENT_PREDICTED_PRE: 92
FEV1/FVC: 57